# Patient Record
Sex: FEMALE | Race: WHITE | NOT HISPANIC OR LATINO | Employment: UNEMPLOYED | URBAN - METROPOLITAN AREA
[De-identification: names, ages, dates, MRNs, and addresses within clinical notes are randomized per-mention and may not be internally consistent; named-entity substitution may affect disease eponyms.]

---

## 2019-05-07 ENCOUNTER — TELEPHONE (OUTPATIENT)
Dept: ORTHOPEDIC SURGERY | Facility: CLINIC | Age: 29
End: 2019-05-07

## 2019-05-07 NOTE — TELEPHONE ENCOUNTER
Spoke to patient and got her rescheduled. She did not have our number to reschedule. Letter in chart for no show, not sent.

## 2019-05-14 ENCOUNTER — TELEPHONE (OUTPATIENT)
Dept: ORTHOPEDIC SURGERY | Facility: CLINIC | Age: 29
End: 2019-05-14

## 2020-04-21 PROCEDURE — 87491 CHLMYD TRACH DNA AMP PROBE: CPT | Performed by: PHYSICIAN ASSISTANT

## 2020-04-21 PROCEDURE — 87591 N.GONORRHOEAE DNA AMP PROB: CPT | Performed by: PHYSICIAN ASSISTANT

## 2020-04-24 ENCOUNTER — TELEPHONE (OUTPATIENT)
Dept: URGENT CARE | Facility: CLINIC | Age: 30
End: 2020-04-24

## 2022-05-16 ENCOUNTER — HOSPITAL ENCOUNTER (INPATIENT)
Facility: HOSPITAL | Age: 32
LOS: 2 days | Discharge: HOME | End: 2022-05-18
Attending: OBSTETRICS & GYNECOLOGY | Admitting: OBSTETRICS & GYNECOLOGY

## 2022-05-16 PROBLEM — Z37.9 NORMAL LABOR: Status: ACTIVE | Noted: 2022-05-16

## 2022-05-16 PROCEDURE — 99221 1ST HOSP IP/OBS SF/LOW 40: CPT | Performed by: OBSTETRICS & GYNECOLOGY

## 2022-05-16 PROCEDURE — G0480 DRUG TEST DEF 1-7 CLASSES: HCPCS | Performed by: OBSTETRICS & GYNECOLOGY

## 2022-05-16 PROCEDURE — 59025 FETAL NON-STRESS TEST: CPT | Performed by: OBSTETRICS & GYNECOLOGY

## 2022-05-16 PROCEDURE — 80306 DRUG TEST PRSMV INSTRMNT: CPT | Performed by: OBSTETRICS & GYNECOLOGY

## 2022-05-16 RX ORDER — SODIUM CHLORIDE 0.9 % (FLUSH) 0.9 %
3 SYRINGE (ML) INJECTION EVERY 12 HOURS SCHEDULED
Status: DISCONTINUED | OUTPATIENT
Start: 2022-05-16 | End: 2022-05-18 | Stop reason: HOSPADM

## 2022-05-16 RX ORDER — BUTORPHANOL TARTRATE 1 MG/ML
1 INJECTION, SOLUTION INTRAMUSCULAR; INTRAVENOUS
Status: DISCONTINUED | OUTPATIENT
Start: 2022-05-16 | End: 2022-05-17

## 2022-05-16 RX ORDER — LIDOCAINE HYDROCHLORIDE 10 MG/ML
5 INJECTION, SOLUTION EPIDURAL; INFILTRATION; INTRACAUDAL; PERINEURAL AS NEEDED
Status: DISCONTINUED | OUTPATIENT
Start: 2022-05-16 | End: 2022-05-18 | Stop reason: HOSPADM

## 2022-05-16 RX ORDER — ONDANSETRON 2 MG/ML
4 INJECTION INTRAMUSCULAR; INTRAVENOUS EVERY 6 HOURS PRN
Status: DISCONTINUED | OUTPATIENT
Start: 2022-05-16 | End: 2022-05-18 | Stop reason: HOSPADM

## 2022-05-16 RX ORDER — MAGNESIUM CARB/ALUMINUM HYDROX 105-160MG
30 TABLET,CHEWABLE ORAL ONCE
Status: DISCONTINUED | OUTPATIENT
Start: 2022-05-16 | End: 2022-05-18 | Stop reason: HOSPADM

## 2022-05-16 RX ORDER — PRENATAL WITH FERROUS FUM AND FOLIC ACID 3080; 920; 120; 400; 22; 1.84; 3; 20; 10; 1; 12; 200; 27; 25; 2 [IU]/1; [IU]/1; MG/1; [IU]/1; MG/1; MG/1; MG/1; MG/1; MG/1; MG/1; UG/1; MG/1; MG/1; MG/1; MG/1
TABLET ORAL DAILY
COMMUNITY

## 2022-05-16 RX ORDER — ONDANSETRON 4 MG/1
4 TABLET, FILM COATED ORAL EVERY 6 HOURS PRN
Status: DISCONTINUED | OUTPATIENT
Start: 2022-05-16 | End: 2022-05-18 | Stop reason: HOSPADM

## 2022-05-16 RX ORDER — SODIUM CHLORIDE, SODIUM LACTATE, POTASSIUM CHLORIDE, CALCIUM CHLORIDE 600; 310; 30; 20 MG/100ML; MG/100ML; MG/100ML; MG/100ML
125 INJECTION, SOLUTION INTRAVENOUS CONTINUOUS
Status: DISCONTINUED | OUTPATIENT
Start: 2022-05-16 | End: 2022-05-18 | Stop reason: HOSPADM

## 2022-05-16 RX ORDER — SODIUM CHLORIDE 0.9 % (FLUSH) 0.9 %
10 SYRINGE (ML) INJECTION AS NEEDED
Status: DISCONTINUED | OUTPATIENT
Start: 2022-05-16 | End: 2022-05-18 | Stop reason: HOSPADM

## 2022-05-17 ENCOUNTER — ANESTHESIA EVENT (OUTPATIENT)
Dept: LABOR AND DELIVERY | Facility: HOSPITAL | Age: 32
End: 2022-05-17

## 2022-05-17 ENCOUNTER — APPOINTMENT (OUTPATIENT)
Dept: WOMENS IMAGING | Facility: HOSPITAL | Age: 32
End: 2022-05-17

## 2022-05-17 ENCOUNTER — ANESTHESIA (OUTPATIENT)
Dept: LABOR AND DELIVERY | Facility: HOSPITAL | Age: 32
End: 2022-05-17

## 2022-05-17 LAB
ABO GROUP BLD: NORMAL
ALP SERPL-CCNC: 338 U/L (ref 39–117)
ALT SERPL W P-5'-P-CCNC: 8 U/L (ref 1–33)
AMPHET+METHAMPHET UR QL: NEGATIVE
AMPHETAMINES UR QL: NEGATIVE
AST SERPL-CCNC: 16 U/L (ref 1–32)
ATMOSPHERIC PRESS: ABNORMAL MM[HG]
ATMOSPHERIC PRESS: ABNORMAL MM[HG]
BARBITURATES UR QL SCN: NEGATIVE
BASE EXCESS BLDCOA CALC-SCNC: -6.3 MMOL/L (ref 0–2)
BASE EXCESS BLDCOV CALC-SCNC: -5.5 MMOL/L (ref 0–2)
BASOPHILS # BLD AUTO: 0.02 10*3/MM3 (ref 0–0.2)
BASOPHILS NFR BLD AUTO: 0.1 % (ref 0–1.5)
BDY SITE: ABNORMAL
BDY SITE: ABNORMAL
BENZODIAZ UR QL SCN: NEGATIVE
BILIRUB SERPL-MCNC: 0.3 MG/DL (ref 0–1.2)
BLD GP AB SCN SERPL QL: NEGATIVE
BODY TEMPERATURE: 37 C
BODY TEMPERATURE: 37 C
BUPRENORPHINE SERPL-MCNC: NEGATIVE NG/ML
CANNABINOIDS SERPL QL: NEGATIVE
CO2 BLDA-SCNC: 23.5 MMOL/L (ref 22–33)
CO2 BLDA-SCNC: 25.9 MMOL/L (ref 22–33)
COCAINE UR QL: POSITIVE
COLLECT TME SMN: ABNORMAL
CREAT SERPL-MCNC: 0.64 MG/DL (ref 0.57–1)
DEPRECATED RDW RBC AUTO: 46.1 FL (ref 37–54)
EOSINOPHIL # BLD AUTO: 0.06 10*3/MM3 (ref 0–0.4)
EOSINOPHIL NFR BLD AUTO: 0.4 % (ref 0.3–6.2)
EPAP: 0
EPAP: 0
ERYTHROCYTE [DISTWIDTH] IN BLOOD BY AUTOMATED COUNT: 14.4 % (ref 12.3–15.4)
HBV SURFACE AG SERPL QL IA: NORMAL
HCO3 BLDCOA-SCNC: 23.9 MMOL/L (ref 16.9–20.5)
HCO3 BLDCOV-SCNC: 22 MMOL/L (ref 18.6–21.4)
HCT VFR BLD AUTO: 34.4 % (ref 34–46.6)
HCV AB SER DONR QL: REACTIVE
HGB BLD-MCNC: 11.5 G/DL (ref 12–15.9)
HGB BLDA-MCNC: 16.6 G/DL (ref 14–18)
HGB BLDA-MCNC: 16.7 G/DL (ref 14–18)
HIV1+2 AB SER QL: NORMAL
IMM GRANULOCYTES # BLD AUTO: 0.07 10*3/MM3 (ref 0–0.05)
IMM GRANULOCYTES NFR BLD AUTO: 0.4 % (ref 0–0.5)
INHALED O2 CONCENTRATION: 21 %
INHALED O2 CONCENTRATION: 21 %
IPAP: 0
IPAP: 0
LDH SERPL-CCNC: 167 U/L (ref 135–214)
LYMPHOCYTES # BLD AUTO: 1.88 10*3/MM3 (ref 0.7–3.1)
LYMPHOCYTES NFR BLD AUTO: 11.1 % (ref 19.6–45.3)
Lab: ABNORMAL
MCH RBC QN AUTO: 29.3 PG (ref 26.6–33)
MCHC RBC AUTO-ENTMCNC: 33.4 G/DL (ref 31.5–35.7)
MCV RBC AUTO: 87.5 FL (ref 79–97)
METHADONE UR QL SCN: NEGATIVE
MODALITY: ABNORMAL
MODALITY: ABNORMAL
MONOCYTES # BLD AUTO: 1.29 10*3/MM3 (ref 0.1–0.9)
MONOCYTES NFR BLD AUTO: 7.6 % (ref 5–12)
NEUTROPHILS NFR BLD AUTO: 13.59 10*3/MM3 (ref 1.7–7)
NEUTROPHILS NFR BLD AUTO: 80.4 % (ref 42.7–76)
NOTE: ABNORMAL
NOTE: ABNORMAL
NOTIFIED BY: ABNORMAL
NOTIFIED WHO: ABNORMAL
NRBC BLD AUTO-RTO: 0 /100 WBC (ref 0–0.2)
OPIATES UR QL: NEGATIVE
OXYCODONE UR QL SCN: NEGATIVE
PAW @ PEAK INSP FLOW SETTING VENT: 0 CMH2O
PAW @ PEAK INSP FLOW SETTING VENT: 0 CMH2O
PCO2 BLDCOA: 65.8 MMHG (ref 43.3–54.9)
PCO2 BLDCOV: 49.2 MM HG (ref 28–40)
PCP UR QL SCN: NEGATIVE
PH BLDCOA: 7.17 PH UNITS (ref 7.22–7.3)
PH BLDCOV: 7.26 PH UNITS (ref 7.31–7.37)
PLATELET # BLD AUTO: 360 10*3/MM3 (ref 140–450)
PMV BLD AUTO: 10.3 FL (ref 6–12)
PO2 BLDCOA: 8.7 MMHG (ref 11.5–43.3)
PO2 BLDCOV: 22.4 MM HG (ref 21–31)
PROPOXYPH UR QL: NEGATIVE
RBC # BLD AUTO: 3.93 10*6/MM3 (ref 3.77–5.28)
RH BLD: POSITIVE
RPR SER QL: NORMAL
SAO2 % BLDCOA: 7.7 %
SAO2 % BLDCOA: ABNORMAL %
SAO2 % BLDCOV: 45.4 %
SARS-COV-2 RDRP RESP QL NAA+PROBE: NORMAL
T&S EXPIRATION DATE: NORMAL
TOTAL RATE: 0 BREATHS/MINUTE
TOTAL RATE: 0 BREATHS/MINUTE
TRICYCLICS UR QL SCN: NEGATIVE
URATE SERPL-MCNC: 4.5 MG/DL (ref 2.4–5.7)
VENTILATOR MODE: ABNORMAL
WBC NRBC COR # BLD: 16.91 10*3/MM3 (ref 3.4–10.8)

## 2022-05-17 PROCEDURE — 80081 OBSTETRIC PANEL INC HIV TSTG: CPT | Performed by: OBSTETRICS & GYNECOLOGY

## 2022-05-17 PROCEDURE — 76815 OB US LIMITED FETUS(S): CPT

## 2022-05-17 PROCEDURE — 84075 ASSAY ALKALINE PHOSPHATASE: CPT | Performed by: OBSTETRICS & GYNECOLOGY

## 2022-05-17 PROCEDURE — C1755 CATHETER, INTRASPINAL: HCPCS | Performed by: ANESTHESIOLOGY

## 2022-05-17 PROCEDURE — 84460 ALANINE AMINO (ALT) (SGPT): CPT | Performed by: OBSTETRICS & GYNECOLOGY

## 2022-05-17 PROCEDURE — 76815 OB US LIMITED FETUS(S): CPT | Performed by: OBSTETRICS & GYNECOLOGY

## 2022-05-17 PROCEDURE — 87635 SARS-COV-2 COVID-19 AMP PRB: CPT | Performed by: OBSTETRICS & GYNECOLOGY

## 2022-05-17 PROCEDURE — 82565 ASSAY OF CREATININE: CPT | Performed by: OBSTETRICS & GYNECOLOGY

## 2022-05-17 PROCEDURE — 25010000002 ONDANSETRON PER 1 MG: Performed by: OBSTETRICS & GYNECOLOGY

## 2022-05-17 PROCEDURE — 51702 INSERT TEMP BLADDER CATH: CPT

## 2022-05-17 PROCEDURE — 84450 TRANSFERASE (AST) (SGOT): CPT | Performed by: OBSTETRICS & GYNECOLOGY

## 2022-05-17 PROCEDURE — 25010000002 PENICILLIN G POTASSIUM PER 600000 UNITS: Performed by: OBSTETRICS & GYNECOLOGY

## 2022-05-17 PROCEDURE — 83615 LACTATE (LD) (LDH) ENZYME: CPT | Performed by: OBSTETRICS & GYNECOLOGY

## 2022-05-17 PROCEDURE — 82247 BILIRUBIN TOTAL: CPT | Performed by: OBSTETRICS & GYNECOLOGY

## 2022-05-17 PROCEDURE — 25010000002 ROPIVACAINE PER 1 MG: Performed by: ANESTHESIOLOGY

## 2022-05-17 PROCEDURE — 25010000002 METOCLOPRAMIDE PER 10 MG: Performed by: ANESTHESIOLOGY

## 2022-05-17 PROCEDURE — 59409 OBSTETRICAL CARE: CPT | Performed by: OBSTETRICS & GYNECOLOGY

## 2022-05-17 PROCEDURE — 3E0E7GC INTRODUCTION OF OTHER THERAPEUTIC SUBSTANCE INTO PRODUCTS OF CONCEPTION, VIA NATURAL OR ARTIFICIAL OPENING: ICD-10-PCS | Performed by: OBSTETRICS & GYNECOLOGY

## 2022-05-17 PROCEDURE — 25010000002 MORPHINE PER 10 MG: Performed by: OBSTETRICS & GYNECOLOGY

## 2022-05-17 PROCEDURE — 82805 BLOOD GASES W/O2 SATURATION: CPT

## 2022-05-17 PROCEDURE — 86803 HEPATITIS C AB TEST: CPT | Performed by: OBSTETRICS & GYNECOLOGY

## 2022-05-17 PROCEDURE — 59025 FETAL NON-STRESS TEST: CPT

## 2022-05-17 PROCEDURE — 88307 TISSUE EXAM BY PATHOLOGIST: CPT | Performed by: OBSTETRICS & GYNECOLOGY

## 2022-05-17 PROCEDURE — 84550 ASSAY OF BLOOD/URIC ACID: CPT | Performed by: OBSTETRICS & GYNECOLOGY

## 2022-05-17 PROCEDURE — 25010000002 FENTANYL CITRATE (PF) 50 MCG/ML SOLUTION: Performed by: ANESTHESIOLOGY

## 2022-05-17 RX ORDER — OXYTOCIN/0.9 % SODIUM CHLORIDE 30/500 ML
2-24 PLASTIC BAG, INJECTION (ML) INTRAVENOUS
Status: DISCONTINUED | OUTPATIENT
Start: 2022-05-17 | End: 2022-05-18 | Stop reason: HOSPADM

## 2022-05-17 RX ORDER — CARBOPROST TROMETHAMINE 250 UG/ML
250 INJECTION, SOLUTION INTRAMUSCULAR AS NEEDED
Status: DISCONTINUED | OUTPATIENT
Start: 2022-05-17 | End: 2022-05-18 | Stop reason: HOSPADM

## 2022-05-17 RX ORDER — OXYTOCIN/0.9 % SODIUM CHLORIDE 30/500 ML
85 PLASTIC BAG, INJECTION (ML) INTRAVENOUS ONCE
Status: COMPLETED | OUTPATIENT
Start: 2022-05-18 | End: 2022-05-17

## 2022-05-17 RX ORDER — ERYTHROMYCIN 5 MG/G
OINTMENT OPHTHALMIC
Status: DISCONTINUED
Start: 2022-05-17 | End: 2022-05-18 | Stop reason: HOSPADM

## 2022-05-17 RX ORDER — MISOPROSTOL 200 UG/1
800 TABLET ORAL AS NEEDED
Status: DISCONTINUED | OUTPATIENT
Start: 2022-05-17 | End: 2022-05-18 | Stop reason: HOSPADM

## 2022-05-17 RX ORDER — METHYLERGONOVINE MALEATE 0.2 MG/ML
200 INJECTION INTRAVENOUS ONCE AS NEEDED
Status: DISCONTINUED | OUTPATIENT
Start: 2022-05-17 | End: 2022-05-18 | Stop reason: HOSPADM

## 2022-05-17 RX ORDER — METOCLOPRAMIDE HYDROCHLORIDE 5 MG/ML
10 INJECTION INTRAMUSCULAR; INTRAVENOUS ONCE AS NEEDED
Status: COMPLETED | OUTPATIENT
Start: 2022-05-17 | End: 2022-05-17

## 2022-05-17 RX ORDER — EPHEDRINE SULFATE 5 MG/ML
INJECTION INTRAVENOUS
Status: DISCONTINUED
Start: 2022-05-17 | End: 2022-05-18 | Stop reason: HOSPADM

## 2022-05-17 RX ORDER — FAMOTIDINE 10 MG/ML
20 INJECTION, SOLUTION INTRAVENOUS ONCE AS NEEDED
Status: DISCONTINUED | OUTPATIENT
Start: 2022-05-17 | End: 2022-05-18 | Stop reason: HOSPADM

## 2022-05-17 RX ORDER — ACETAMINOPHEN 500 MG
1000 TABLET ORAL ONCE
Status: COMPLETED | OUTPATIENT
Start: 2022-05-17 | End: 2022-05-17

## 2022-05-17 RX ORDER — ONDANSETRON 2 MG/ML
4 INJECTION INTRAMUSCULAR; INTRAVENOUS ONCE AS NEEDED
Status: DISCONTINUED | OUTPATIENT
Start: 2022-05-17 | End: 2022-05-18 | Stop reason: HOSPADM

## 2022-05-17 RX ORDER — TRISODIUM CITRATE DIHYDRATE AND CITRIC ACID MONOHYDRATE 500; 334 MG/5ML; MG/5ML
30 SOLUTION ORAL ONCE
Status: DISCONTINUED | OUTPATIENT
Start: 2022-05-17 | End: 2022-05-18 | Stop reason: HOSPADM

## 2022-05-17 RX ORDER — FENTANYL CITRATE 50 UG/ML
INJECTION, SOLUTION INTRAMUSCULAR; INTRAVENOUS AS NEEDED
Status: DISCONTINUED | OUTPATIENT
Start: 2022-05-17 | End: 2022-05-18 | Stop reason: SURG

## 2022-05-17 RX ORDER — OXYTOCIN/0.9 % SODIUM CHLORIDE 30/500 ML
650 PLASTIC BAG, INJECTION (ML) INTRAVENOUS ONCE
Status: COMPLETED | OUTPATIENT
Start: 2022-05-18 | End: 2022-05-17

## 2022-05-17 RX ORDER — LIDOCAINE HYDROCHLORIDE AND EPINEPHRINE 15; 5 MG/ML; UG/ML
INJECTION, SOLUTION EPIDURAL AS NEEDED
Status: DISCONTINUED | OUTPATIENT
Start: 2022-05-17 | End: 2022-05-18 | Stop reason: SURG

## 2022-05-17 RX ORDER — DIPHENHYDRAMINE HYDROCHLORIDE 50 MG/ML
12.5 INJECTION INTRAMUSCULAR; INTRAVENOUS EVERY 8 HOURS PRN
Status: DISCONTINUED | OUTPATIENT
Start: 2022-05-17 | End: 2022-05-18 | Stop reason: HOSPADM

## 2022-05-17 RX ORDER — NICOTINE 21 MG/24HR
1 PATCH, TRANSDERMAL 24 HOURS TRANSDERMAL
Status: DISCONTINUED | OUTPATIENT
Start: 2022-05-17 | End: 2022-05-18 | Stop reason: HOSPADM

## 2022-05-17 RX ORDER — ROPIVACAINE HYDROCHLORIDE 2 MG/ML
15 INJECTION, SOLUTION EPIDURAL; INFILTRATION; PERINEURAL CONTINUOUS
Status: DISCONTINUED | OUTPATIENT
Start: 2022-05-17 | End: 2022-05-18 | Stop reason: HOSPADM

## 2022-05-17 RX ORDER — MORPHINE SULFATE 4 MG/ML
4 INJECTION, SOLUTION INTRAMUSCULAR; INTRAVENOUS
Status: DISCONTINUED | OUTPATIENT
Start: 2022-05-17 | End: 2022-05-18 | Stop reason: HOSPADM

## 2022-05-17 RX ORDER — EPHEDRINE SULFATE 5 MG/ML
10 INJECTION INTRAVENOUS
Status: DISCONTINUED | OUTPATIENT
Start: 2022-05-17 | End: 2022-05-18 | Stop reason: HOSPADM

## 2022-05-17 RX ADMIN — Medication 1 MILLI-UNITS/MIN: at 08:07

## 2022-05-17 RX ADMIN — MORPHINE SULFATE 4 MG: 4 INJECTION, SOLUTION INTRAMUSCULAR; INTRAVENOUS at 04:24

## 2022-05-17 RX ADMIN — METOCLOPRAMIDE 10 MG: 5 INJECTION, SOLUTION INTRAMUSCULAR; INTRAVENOUS at 22:39

## 2022-05-17 RX ADMIN — LIDOCAINE HYDROCHLORIDE AND EPINEPHRINE 2 ML: 15; 5 INJECTION, SOLUTION EPIDURAL at 05:15

## 2022-05-17 RX ADMIN — SODIUM CHLORIDE, POTASSIUM CHLORIDE, SODIUM LACTATE AND CALCIUM CHLORIDE 125 ML/HR: 600; 310; 30; 20 INJECTION, SOLUTION INTRAVENOUS at 05:41

## 2022-05-17 RX ADMIN — SODIUM CHLORIDE 3 MILLION UNITS: 9 INJECTION, SOLUTION INTRAVENOUS at 14:37

## 2022-05-17 RX ADMIN — SODIUM CHLORIDE 3 MILLION UNITS: 9 INJECTION, SOLUTION INTRAVENOUS at 22:39

## 2022-05-17 RX ADMIN — Medication 650 ML/HR: at 23:17

## 2022-05-17 RX ADMIN — SODIUM CHLORIDE, POTASSIUM CHLORIDE, SODIUM LACTATE AND CALCIUM CHLORIDE 1000 ML: 600; 310; 30; 20 INJECTION, SOLUTION INTRAVENOUS at 04:57

## 2022-05-17 RX ADMIN — ROPIVACAINE HYDROCHLORIDE 14 ML/HR: 2 INJECTION, SOLUTION EPIDURAL; INFILTRATION at 05:21

## 2022-05-17 RX ADMIN — FENTANYL CITRATE 100 MCG: 50 INJECTION, SOLUTION INTRAMUSCULAR; INTRAVENOUS at 05:16

## 2022-05-17 RX ADMIN — Medication 1 PATCH: at 02:08

## 2022-05-17 RX ADMIN — SODIUM CHLORIDE, POTASSIUM CHLORIDE, SODIUM LACTATE AND CALCIUM CHLORIDE 125 ML/HR: 600; 310; 30; 20 INJECTION, SOLUTION INTRAVENOUS at 01:41

## 2022-05-17 RX ADMIN — ROPIVACAINE HYDROCHLORIDE 14 ML/HR: 2 INJECTION, SOLUTION EPIDURAL; INFILTRATION at 12:42

## 2022-05-17 RX ADMIN — SODIUM CHLORIDE 5 MILLION UNITS: 900 INJECTION INTRAVENOUS at 02:08

## 2022-05-17 RX ADMIN — SODIUM CHLORIDE, POTASSIUM CHLORIDE, SODIUM LACTATE AND CALCIUM CHLORIDE 300 ML: 600; 310; 30; 20 INJECTION, SOLUTION INTRAVENOUS at 09:40

## 2022-05-17 RX ADMIN — ACETAMINOPHEN 1000 MG: 500 TABLET ORAL at 19:21

## 2022-05-17 RX ADMIN — SODIUM CHLORIDE 3 MILLION UNITS: 9 INJECTION, SOLUTION INTRAVENOUS at 18:44

## 2022-05-17 RX ADMIN — SODIUM CHLORIDE 3 MILLION UNITS: 9 INJECTION, SOLUTION INTRAVENOUS at 09:49

## 2022-05-17 RX ADMIN — ONDANSETRON 4 MG: 2 INJECTION INTRAMUSCULAR; INTRAVENOUS at 13:12

## 2022-05-17 RX ADMIN — Medication 1 MILLI-UNITS/MIN: at 03:50

## 2022-05-17 RX ADMIN — MORPHINE SULFATE 4 MG: 4 INJECTION, SOLUTION INTRAMUSCULAR; INTRAVENOUS at 02:14

## 2022-05-17 RX ADMIN — LIDOCAINE HYDROCHLORIDE AND EPINEPHRINE 3 ML: 15; 5 INJECTION, SOLUTION EPIDURAL at 05:14

## 2022-05-17 RX ADMIN — ROPIVACAINE HYDROCHLORIDE 15 ML/HR: 2 INJECTION, SOLUTION EPIDURAL; INFILTRATION at 19:45

## 2022-05-17 RX ADMIN — Medication 85 ML/HR: at 23:55

## 2022-05-17 RX ADMIN — ROPIVACAINE HYDROCHLORIDE 10 ML: 5 INJECTION, SOLUTION EPIDURAL; INFILTRATION; PERINEURAL at 05:16

## 2022-05-17 RX ADMIN — SODIUM CHLORIDE, POTASSIUM CHLORIDE, SODIUM LACTATE AND CALCIUM CHLORIDE 125 ML/HR: 600; 310; 30; 20 INJECTION, SOLUTION INTRAVENOUS at 14:05

## 2022-05-17 NOTE — PROGRESS NOTES
Breana Coleman  [id number]  1990      VE 4-5/80%/-1  IUPC placed  Labs Hep C+ (pt did know this)  UDS + for cocaine, pt denies ever using  P/1)hold pitocin until PDC scan (to r/o any reason for vaginal delivery)    Isaias Barclay MD  5/17/2022  06:58 EDT

## 2022-05-17 NOTE — PROGRESS NOTES
LAborist    FHR cat 1  SVE  5/80/-1  ROP  CTX 3-6 min and inadequate contractions.  PLAN  1.  Continue Pitocin augmentation

## 2022-05-17 NOTE — PROGRESS NOTES
Breana Coleman  [id number]  1990      VE 4/80%/-1  FHT's reassuring.  IV now in.  P/1)oxytocin per protocol    Isaias Barclay MD  5/17/2022  01:45 EDT

## 2022-05-17 NOTE — H&P
"Breana Coleman  1990  [id number]  [id number]    CC: contractions  HPI:  Patient is 31 y.o. female   currently at Unknown presents with c/o uterine contractions, onset this am, rates 8/10, denies assoc bleeding or leaking.  Good FM.  PNC comp by tob use and no PNC     PMH:  Current meds: PNV  Illnesses: MRSA  Surgeries: Mult I and D's (~18) for MRSA  Allergies: NKDA    Past OB History:       OB History    Para Term  AB Living   2 1 1 0 0 1   SAB IAB Ectopic Molar Multiple Live Births   0 0 0 0 0 1      # Outcome Date GA Lbr Aaron/2nd Weight Sex Delivery Anes PTL Lv   2 Current            1 Term 12 39w0d  2807 g (6 lb 3 oz) F Vag-Spont EPI N NORMA      Complications: Preeclampsia            SH: tob 1PPD , EtOH neg, drugs neg  FH: heart dz neg , diabetes neg , cancer neg    General ROS: contractions.   All other systems reviewed and are negative.      Physical Examination: General appearance - alert, well appearing, and in no distress  Vital signs - /96   Pulse 107   Temp 98.2 °F (36.8 °C) (Oral)   Resp 18   Ht 149.9 cm (59\")   LMP 2021 (Approximate)   SpO2 96%   Breastfeeding No   BMI 22.22 kg/m²   HEENT: normocephalic, atraumatic,oropharynx clear, appearance of ears and nose normal  Neck - supple, no significant adenopathy, no thyromegaly  Lymphatics - no palpable lymphadenopathy in the neck or groin, no hepatosplenomegaly  Chest - clear to auscultation, no wheezes, rales or rhonchi, respiratory effort non-labored  Heart - normal rate, regular rhythm, no murmurs, rubs, clicks or gallops, no JVD, no lower extremity edema  Abdomen - soft, nontender, nondistended, no masses, no hepatosplenomegaly  no rebound tenderness noted, bowel sounds normal  Vaginal Exam: 380%/-1 (per RN) ,external genitalia normal  Extremities - no pedal edema noted, no calf tend, mult healed skin lesions  Skin -warm and dry, normal coloration and turgor, no rashes, no suspicious skin lesions " noted    Fetal monitoring: indication contractions , onset 2110 , offset 2217 , baseline 120-125 , mod BTB variability , multiple accels (15 X 15), one variable decel, irreg contractions, interpretation reactive NST with single variable    Radiology US: AC ~35 weeks, FL ~38 weeks, ant placenta (mature appearing), oligo, anomaly scan not performed due to oligo and ant placenta    Assessment 1)IUP ~37 weeks   2)labor   3)no PNC   4)hx MRSA   5)tob use     Plan 1)admit   2)GBS prophylaxis (since unsure dates)    Isaias Barclay MD  5/16/2022  22:13 EDT

## 2022-05-17 NOTE — CASE MANAGEMENT/SOCIAL WORK
Continued Stay Note  [facility]     Patient Name: Breana Coleman  MRN: [id number]  Today's Date: 2022    Admit Date: 2022     Discharge Plan     Row Name 22 1542       Plan    Plan MSW available    Plan Comments Visited pt and FOB. Mother reports MGF became very ill prior to scheduled c/s and she had to make decision to take him off life support on . She reports she was with him when he . Mother is very tearful and is actively expressing her emotions and grief. Pt reports she would like to discuss meds with her provider and may be interested in therapy. MSW offered support. Notified MD and provided info on [place] for therapy.    Final Discharge Disposition Code 01 - home or self-care               Discharge Codes    No documentation.                     FREDDIE Frausto

## 2022-05-17 NOTE — ANESTHESIA PROCEDURE NOTES
Labor Epidural      Patient reassessed immediately prior to procedure    Patient location during procedure: OB  Performed By  Anesthesiologist: Chris Carbajal DO  Preanesthetic Checklist  Completed: patient identified, IV checked, site marked, risks and benefits discussed, surgical consent, monitors and equipment checked, pre-op evaluation and timeout performed  Additional Notes  Dural puncture performed with 5 inch 25 g Silvio needle, clear CSF.  Prep:  Pt Position:sitting  Sterile Tech:gloves, mask, sterile barrier and cap  Prep:chlorhexidine gluconate and isopropyl alcohol  Monitoring:blood pressure monitoring and continuous pulse oximetry  Epidural Block Procedure:  Approach:midline  Guidance:landmark technique and palpation technique  Location:L3-L4  Needle Type:Tuohy  Needle Gauge:17 G  Loss of Resistance Medium: air  Loss of Resistance: 3cm  Cath Depth at skin:8 cm  Paresthesia: none  Aspiration:negative  Test Dose:negative  Number of Attempts: 1  Post Assessment:  Dressing:secured with tape and occlusive dressing applied (Tegaderm Placed)  Pt Tolerance:patient tolerated the procedure well with no apparent complications  Complications:no

## 2022-05-17 NOTE — PLAN OF CARE
Problem: Adult Inpatient Plan of Care  Goal: Plan of Care Review  Outcome: Ongoing, Progressing  Goal: Patient-Specific Goal (Individualized)  Outcome: Ongoing, Progressing  Goal: Absence of Hospital-Acquired Illness or Injury  Outcome: Ongoing, Progressing  Intervention: Prevent Skin Injury  Description: Perform a screening for skin injury risk, such as pressure or moisture associated skin damage on admission and at regular intervals throughout hospital stay.  Keep all areas of skin (especially folds) clean and dry.  Maintain adequate skin hydration.  Relieve and redistribute pressure and protect bony prominences; implement measures based on patient-specific risk factors.  Match turning and repositioning schedule to clinical condition.  Encourage weight shift frequently; assist with reposition if unable to complete independently.  Float heels off bed; avoid pressure on the Achilles tendon.  Keep skin free from extended contact with medical devices.  Encourage functional activity and mobility, as early as tolerated.  Use aids (e.g., slide boards, mechanical lift) during transfer.  Recent Flowsheet Documentation  Taken 5/17/2022 1921 by Maria Antonia Nice RN  Body Position:   left   30 degrees  Intervention: Prevent Infection  Description: Maintain skin and mucous membrane integrity; promote hand, oral and pulmonary hygiene.  Optimize fluid balance, nutrition, sleep and glycemic control to maximize infection resistance.  Identify potential sources of infection early to prevent or mitigate progression of infection (e.g., wound, lines, devices).  Evaluate ongoing need for invasive devices; remove promptly when no longer indicated.  Recent Flowsheet Documentation  Taken 5/17/2022 1921 by Maria Antonia Nice, RN  Infection Prevention:   visitors restricted/screened   single patient room provided   rest/sleep promoted   personal protective equipment utilized   hand hygiene promoted   equipment surfaces disinfected    environmental surveillance performed  Goal: Optimal Comfort and Wellbeing  Outcome: Ongoing, Progressing  Intervention: Provide Person-Centered Care  Description: Use a family-focused approach to care.  Develop trust and rapport by proactively providing information, encouraging questions, addressing concerns and offering reassurance.  Acknowledge emotional response to hospitalization.  Recognize and utilize personal coping strategies.  Honor spiritual and cultural preferences.  Recent Flowsheet Documentation  Taken 2022 1921 by Maria Antonia Nice RN  Trust Relationship/Rapport:   care explained   choices provided   emotional support provided   empathic listening provided   questions answered   questions encouraged   reassurance provided   thoughts/feelings acknowledged  Goal: Readiness for Transition of Care  Outcome: Ongoing, Progressing     Problem: Bleeding (Labor)  Goal: Hemostasis  Outcome: Ongoing, Progressing     Problem: Change in Fetal Wellbeing (Labor)  Goal: Stable Fetal Wellbeing  Outcome: Ongoing, Progressing  Intervention: Promote and Monitor Fetal Wellbeing  Description: Evaluate fetal heart rate tracing.  Initiate continuous electronic fetal monitoring if patient is experiencing a TOLAC (trial of labor after ).  Facilitate maternal lateral position change to improve uteroplacental blood flow and maternal blood pressure; utilize hip wedge as needed.  Monitor uterine contraction pattern; assess for presence of tachysystole.  Prepare to discontinue oxytocin or labor induction agent in the presence of tachysystole, as applicable.  Anticipate need for tocolytic administration if tachysystole persists.  Prepare for intravenous fluid bolus administration to improve maternal fluid status and treat hypotension.  In the presence of maternal hypoxia, prepare to administer oxygen therapy.  Review maternal medication history for possible impact on fetal heart rate tracing (e.g.,  corticosteroid).  Prepare for additional procedure, as indicated, to improve fetal wellbeing (e.g., amnioinfusion, fetal stimulation).  Modify pushing effort, if in second stage of labor.  Prepare for expedited delivery if fetal status does not improve.  Recent Flowsheet Documentation  Taken 2022 by Maria Antonia Nice RN  Body Position:   left   30 degrees     Problem: Delayed Labor Progression (Labor)  Goal: Effective Progression to Delivery  Outcome: Ongoing, Progressing     Problem: Infection (Labor)  Goal: Absence of Infection Signs and Symptoms  Outcome: Ongoing, Progressing  Intervention: Prevent or Manage Infection  Description: Verify Group B streptococcus status and presence of known infection.  Limit digital vaginal exams or invasive vaginal procedures, especially after membranes are ruptured.  Promote perineal hygiene.  Prepare to administer antimicrobial therapy prophylaxis within 60 minutes prior to  delivery, unless there is current coverage provided by existing antimicrobial therapy regimen. Note: If  delivery is urgently needed, prophylaxis shou  Obtain cultures prior to initiating antimicrobial therapy when possible, if suspected or known infection. Do not delay treatment for laboratory results in the presence of high suspicion or clinical indicators.  Administer ordered antimicrobial therapy promptly; reassess need regularly.  Provide fever-reduction and comfort measures.  Monitor laboratory value, diagnostic test and clinical status trends for signs of infection progression.  Identify early signs of sepsis, such as increased heart rate and decreased blood pressure, as well as changes in mental state, respiratory pattern or peripheral perfusion.  Prepare for rapid sepsis management, including lactate level, intravenous access, fluid administration and oxygen therapy.  Notify  team for delivery.  Anticipate need for single-dose antimicrobial therapy prophylaxis  following anal sphincter injury.  Prepare to send placenta for histology following delivery, as indicated.  Recent Flowsheet Documentation  Taken 5/17/2022 1921 by Maria Antonia Nice, RN  Infection Prevention:   visitors restricted/screened   single patient room provided   rest/sleep promoted   personal protective equipment utilized   hand hygiene promoted   equipment surfaces disinfected   environmental surveillance performed     Problem: Labor Pain (Labor)  Goal: Acceptable Pain Control  Outcome: Ongoing, Progressing     Problem: Uterine Tachysystole (Labor)  Goal: Normal Uterine Contraction Pattern  Outcome: Ongoing, Progressing   Goal Outcome Evaluation:

## 2022-05-17 NOTE — ANESTHESIA PREPROCEDURE EVALUATION
Anesthesia Evaluation     Patient summary reviewed and Nursing notes reviewed   NPO Solid Status: > 6 hours  NPO Liquid Status: < 2 hours           Airway   Mallampati: II  TM distance: >3 FB  Neck ROM: full  No difficulty expected  Dental      Pulmonary - negative pulmonary ROS   Cardiovascular - negative cardio ROS        Neuro/Psych- negative ROS  GI/Hepatic/Renal/Endo - negative ROS     Musculoskeletal (-) negative ROS    Abdominal    Substance History - negative use     OB/GYN    (+) Pregnant, pregnancy induced hypertension        Other                        Anesthesia Plan    ASA 2     epidural       Anesthetic plan, all risks, benefits, and alternatives have been provided, discussed and informed consent has been obtained with: patient.  Use of blood products discussed with patient .       CODE STATUS:    Level Of Support Discussed With: Patient  Code Status (Patient has no pulse and is not breathing): CPR (Attempt to Resuscitate)  Medical Interventions (Patient has pulse or is breathing): Full Support

## 2022-05-18 VITALS
RESPIRATION RATE: 18 BRPM | OXYGEN SATURATION: 96 % | DIASTOLIC BLOOD PRESSURE: 85 MMHG | BODY MASS INDEX: 22.22 KG/M2 | SYSTOLIC BLOOD PRESSURE: 133 MMHG | HEIGHT: 59 IN | TEMPERATURE: 98 F | HEART RATE: 68 BPM

## 2022-05-18 LAB — RUBV IGG SERPL IA-ACNC: 4.43 INDEX

## 2022-05-18 PROCEDURE — 59025 FETAL NON-STRESS TEST: CPT

## 2022-05-18 PROCEDURE — 51701 INSERT BLADDER CATHETER: CPT

## 2022-05-18 PROCEDURE — 99238 HOSP IP/OBS DSCHRG MGMT 30/<: CPT | Performed by: OBSTETRICS & GYNECOLOGY

## 2022-05-18 RX ORDER — KETOROLAC TROMETHAMINE 30 MG/ML
30 INJECTION, SOLUTION INTRAMUSCULAR; INTRAVENOUS EVERY 6 HOURS PRN
Status: DISCONTINUED | OUTPATIENT
Start: 2022-05-18 | End: 2022-05-18 | Stop reason: HOSPADM

## 2022-05-18 RX ORDER — IBUPROFEN 600 MG/1
600 TABLET ORAL EVERY 6 HOURS PRN
Status: DISCONTINUED | OUTPATIENT
Start: 2022-05-18 | End: 2022-05-18 | Stop reason: HOSPADM

## 2022-05-18 RX ORDER — BISACODYL 10 MG
10 SUPPOSITORY, RECTAL RECTAL DAILY PRN
Status: DISCONTINUED | OUTPATIENT
Start: 2022-05-18 | End: 2022-05-18 | Stop reason: HOSPADM

## 2022-05-18 RX ORDER — ACETAMINOPHEN 500 MG
1000 TABLET ORAL EVERY 4 HOURS PRN
Status: DISCONTINUED | OUTPATIENT
Start: 2022-05-18 | End: 2022-05-18 | Stop reason: HOSPADM

## 2022-05-18 RX ORDER — DOCUSATE SODIUM 100 MG/1
100 CAPSULE, LIQUID FILLED ORAL 2 TIMES DAILY
Status: DISCONTINUED | OUTPATIENT
Start: 2022-05-18 | End: 2022-05-18 | Stop reason: HOSPADM

## 2022-05-18 RX ORDER — SODIUM CHLORIDE 0.9 % (FLUSH) 0.9 %
1-10 SYRINGE (ML) INJECTION AS NEEDED
Status: DISCONTINUED | OUTPATIENT
Start: 2022-05-18 | End: 2022-05-18 | Stop reason: HOSPADM

## 2022-05-18 RX ORDER — HYDROXYZINE HYDROCHLORIDE 25 MG/1
50 TABLET, FILM COATED ORAL NIGHTLY PRN
Status: DISCONTINUED | OUTPATIENT
Start: 2022-05-18 | End: 2022-05-18 | Stop reason: HOSPADM

## 2022-05-18 RX ORDER — HYDROCORTISONE 25 MG/G
1 CREAM TOPICAL AS NEEDED
Status: DISCONTINUED | OUTPATIENT
Start: 2022-05-18 | End: 2022-05-18 | Stop reason: HOSPADM

## 2022-05-18 RX ADMIN — IBUPROFEN 600 MG: 600 TABLET ORAL at 01:15

## 2022-05-18 RX ADMIN — Medication: at 01:14

## 2022-05-18 RX ADMIN — ACETAMINOPHEN 1000 MG: 500 TABLET ORAL at 02:55

## 2022-05-18 RX ADMIN — WITCH HAZEL 1 PAD: 500 SOLUTION RECTAL; TOPICAL at 01:15

## 2022-05-18 NOTE — NURSING NOTE
"Dr. Barclay notified patient wanted something more for pain. Ordered 30mg toradol q6h. Nurse went back to room and patient had taken out her IV and said she \"just wants to go home and baby is going up for adoption anyways.\" Jodi Simms notified and came and talked to patient and explained all risks. Patient still wanted to go home. 0320 Dr. Barclay notified patient wants to leave AMA. 0340 patient left after signing paper.  "

## 2022-05-18 NOTE — DISCHARGE SUMMARY
Breana Coleman  [id number]  1990      Pt desires d/c.  Pt self d/c'ed IV.  Pt s/p  at 2307.  Hospital course: pt was admitted  with c/o contractions.  Pt with no PNC.  Labs c/w Hep C+ and UDS pos for cocaine.  Pt had PDC US which showed no anomalies.  Pt subsequently underwent a vaginal delivery w/o complications.  P/1)d/c home     2)d/c meds: none     3)pt has been asked to f/u with G provider, names given    Isaias Barclay MD  2022  03:20 EDT

## 2022-05-18 NOTE — PROGRESS NOTES
Breana Coleman  [id number]  1990      VE rim/-1-0, baby remains strait OP  FHT's reassuring  Contractions adequate  P/1)continue current management    Isaias Barclay MD  5/17/2022  22:11 EDT

## 2022-05-18 NOTE — PAYOR COMM NOTE
Notification of Delivery    Breana Coleman  1990  ID: [id number]    ACC/Utilization Review  Phone: [phone]  Fax: [phone]    [address] [address] [place] [address]    Delivery: Vaginal, Spontaneous     YOB: 2020    Time of Birth:  Gestational Age 11:07 PM   35w0d      Anesthesia:      Delivering clinician: Isaias ALEXANDER High    Forceps?   No   Vacuum? No    Shoulder dystocia present: No                   Delivery narrative:  Pt spont del a female infant, strait OP, cord clamping delayed.  Cord clamped and cut.  Segment of cord for ABG's.  Cord blood obtained and placenta manually extracted intact.  Uterus explored with no palpable fragments.  Cervical laceration repaired with restoration of normal anatomy.  First degree perineal left unrepaired     Infant     Findings: female  infant      Infant observations: Weight: 2745 g (6 lb 0.8 oz)   Length: 20  in  Observations/Comments:        Apgars:   @ 1 minute /       @ 5 minutes   Infant Name:              Placenta, Cord, and Fluid      Placenta delivered  Manual removal  at         Cord:   present.   Nuchal Cord?  no    Cord blood obtained: Yes     Cord gases obtained:             Yes     Cord gas results: Venous:  No results found for: PHCVEN      Arterial:  No results found for: PHCART       Repair     Episiotomy: Not recorded     No    Lacerations: No   Estimated Blood Loss:                 Quantitative Blood Loss:                 Complications  No prenatal care, meconium, Hep C+     Disposition  Mother to Mother Baby/Postpartum  in stable condition currently.  Baby to remains with mom  in stable condition currently.                   Breana Coleman (31 y.o. Female)             Date of Birth   1990    Social Security Number       Address   [address] [place] [address]    Home Phone   [phone]    [facility]   [phone]       Confucianism   None    Marital Status                   "             Admission Date   22    Admission Type   Elective    Admitting Provider   Mauricio Woody MD    Attending Provider       Department, Room/Bed   [facility] MOTHER BABY 4B, N434/1       Discharge Date   2022    Discharge Disposition   Home or Self Care    Discharge Destination                               Attending Provider: (none)   Allergies: No Known Allergies    Isolation: None   Infection: None   Code Status: CPR   Advance Care Planning Activity    Ht: 149.9 cm (59\")   Wt: --    Admission Cmt: None   Principal Problem: None                Active Insurance as of 2022     Primary Coverage     Payor Plan Insurance Group Employer/Plan Group    [facility] MEDICAID      Payor Plan Address Payor Plan Phone Number Payor Plan Fax Number Effective Dates    [address] [address] [phone]  2019 - None Entered    [facility] [id number]       Subscriber Name Subscriber Birth Date Member ID       BREANA SORIANO 1990 [id number]                 Emergency Contacts      (Rel.) Home Phone Work Phone Mobile Phone    CELINE VERA (Significant Other) -- -- [phone]            Problem List           Codes Noted - Resolved       Hospital    Normal labor ICD-10-CM: O80, Z37.9  ICD-9-CM: 650 2022 - Present             History & Physical      Isaias Barclay MD at 22 2213          Breana Chileliris  1990  [id number]  [id number]    CC: contractions  HPI:  Patient is 31 y.o. female   currently at Unknown presents with c/o uterine contractions, onset this am, rates 8/10, denies assoc bleeding or leaking.  Good FM.  PNC comp by tob use and no PNC     PMH:  Current meds: PNV  Illnesses: MRSA  Surgeries: Mult I and D's (~18) for MRSA  Allergies: NKDA    Past OB History:       OB History    Para Term  AB Living   2 1 1 0 0 1   SAB IAB Ectopic Molar Multiple Live Births   0 0 0 0 0 1      # Outcome Date GA Lbr Aaron/2nd Weight Sex Delivery " "Anes PTL Lv   2 Current            1 Term 08/11/12 39w0d  2807 g (6 lb 3 oz) F Vag-Spont EPI N NORMA      Complications: Preeclampsia            SH: tob 1PPD , EtOH neg, drugs neg  FH: heart dz neg , diabetes neg , cancer neg    General ROS: contractions.   All other systems reviewed and are negative.      Physical Examination: General appearance - alert, well appearing, and in no distress  Vital signs - /96   Pulse 107   Temp 98.2 °F (36.8 °C) (Oral)   Resp 18   Ht 149.9 cm (59\")   LMP 07/18/2021 (Approximate)   SpO2 96%   Breastfeeding No   BMI 22.22 kg/m²   HEENT: normocephalic, atraumatic,oropharynx clear, appearance of ears and nose normal  Neck - supple, no significant adenopathy, no thyromegaly  Lymphatics - no palpable lymphadenopathy in the neck or groin, no hepatosplenomegaly  Chest - clear to auscultation, no wheezes, rales or rhonchi, respiratory effort non-labored  Heart - normal rate, regular rhythm, no murmurs, rubs, clicks or gallops, no JVD, no lower extremity edema  Abdomen - soft, nontender, nondistended, no masses, no hepatosplenomegaly  no rebound tenderness noted, bowel sounds normal  Vaginal Exam: 3/80%/-1 (per RN) ,external genitalia normal  Extremities - no pedal edema noted, no calf tend, mult healed skin lesions  Skin -warm and dry, normal coloration and turgor, no rashes, no suspicious skin lesions noted    Fetal monitoring: indication contractions , onset 2110 , offset 2217 , baseline 120-125 , mod BTB variability , multiple accels (15 X 15), one variable decel, irreg contractions, interpretation reactive NST with single variable    Radiology US: AC ~35 weeks, FL ~38 weeks, ant placenta (mature appearing), oligo, anomaly scan not performed due to oligo and ant placenta    Assessment 1)IUP ~37 weeks   2)labor   3)no PNC   4)hx MRSA   5)tob use     Plan 1)admit   2)GBS prophylaxis (since unsure dates)    Isaias Barclay MD  5/16/2022  22:13 EDT                                 "                                      Electronically signed by Isaias Barclay MD at 05/17/22 0145       Vital Signs (last 3 days) before discharge     Date/Time Temp Temp src Pulse Resp BP Patient Position SpO2    05/18/22 0206 98 (36.7) Oral 68 18 133/85 -- --    05/18/22 0100 98 (36.7) Oral 71 16 137/77 -- --    05/18/22 0044 98.3 (36.8) Oral 71 -- 153/92 -- --    05/18/22 0036 -- -- 90 -- 123/78 -- --    05/18/22 0014 98.5 (36.9) Oral 77 -- 134/85 -- --    05/17/22 2344 -- -- 87 -- 123/74 -- --    05/17/22 2329 -- -- 110 -- 123/69 -- --    05/17/22 2314 98.7 (37.1) Oral 111 -- 135/88 -- --    05/17/22 2244 -- -- 108 -- 100/58 -- --    05/17/22 2229 -- -- 118 -- 122/70 -- --    05/17/22 2213 -- -- 90 -- 110/61 -- --    05/17/22 2159 -- -- 83 -- 106/58 -- --    05/17/22 2144 98.4 (36.9) Oral 101 -- 121/82 -- --    05/17/22 2129 -- -- 93 -- 115/77 -- --    05/17/22 2113 -- -- 99 -- 123/79 -- --    05/17/22 2059 -- -- 91 -- 120/75 -- --    05/17/22 2045 -- -- 83 -- 120/83 -- --    05/17/22 2029 -- -- 92 -- 126/80 -- --    05/17/22 2014 -- -- 104 -- 123/81 -- --    05/17/22 1959 -- -- 96 -- 110/64 -- --    05/17/22 1946 -- -- 84 -- 117/71 -- --    05/17/22 1929 -- -- 83 -- 106/62 -- --    05/17/22 1916 98.2 (36.8) Oral 76 20 -- -- --    05/17/22 1914 -- -- 90 -- 148/129 -- --    05/17/22 1859 -- -- 95 -- 115/77 -- --    05/17/22 1745 -- -- 115 -- 115/80 -- --    05/17/22 1730 -- -- 96 -- 119/85 -- --    05/17/22 1715 -- -- 118 -- 112/74 -- --    05/17/22 1700 -- -- 76 -- 116/83 -- --    05/17/22 1645 -- -- 84 -- 107/82 -- --    05/17/22 1630 -- -- 94 -- 115/76 -- --    05/17/22 1615 -- -- 80 -- 115/76 -- --    05/17/22 1600 97.9 (36.6) Oral 83 -- 119/74 -- --    05/17/22 1545 -- -- 84 -- 114/70 -- --    05/17/22 1515 -- -- 69 -- 112/69 -- --    05/17/22 1500 -- -- 73 -- 105/71 -- --    05/17/22 1445 -- -- 93 -- 111/74 -- --    05/17/22 1430 -- -- 66 -- 107/74 -- --    05/17/22 1415 -- -- 67 -- 105/73 -- --    05/17/22  1400 98 (36.7) Oral 61 -- 101/76 -- --    05/17/22 1344 -- -- 68 -- 98/73 -- --    05/17/22 1329 -- -- 62 -- 114/74 -- --    05/17/22 1315 -- -- 80 -- 103/70 -- --    05/17/22 1259 -- -- 80 -- 120/95 -- --    05/17/22 1244 -- -- 70 -- 119/82 -- --    05/17/22 1230 -- -- 70 -- 135/93 -- --    05/17/22 1223 -- -- 71 -- 127/73 -- --    05/17/22 1200 98.3 (36.8) Oral 72 -- 131/86 -- --    05/17/22 1144 -- -- 66 -- 129/87 -- --    05/17/22 1129 -- -- 71 -- 114/80 -- --    05/17/22 1114 -- -- 79 -- 125/80 -- --    05/17/22 1100 -- -- 76 -- 116/79 -- --    05/17/22 1045 -- -- 72 -- 128/85 -- --    05/17/22 1030 -- -- 72 -- 130/87 -- --    05/17/22 1015 -- -- 71 -- 129/90 -- --    05/17/22 1000 98 (36.7) Oral -- -- -- -- --    05/17/22 0959 -- -- 69 -- 119/88 -- --    05/17/22 0944 -- -- 72 -- 119/85 -- --    05/17/22 0929 -- -- 73 -- 120/79 -- --    05/17/22 0916 -- -- 66 -- 133/80 -- --    05/17/22 0859 -- -- 77 -- 105/60 -- --    05/17/22 0829 -- -- 72 -- 85/52 -- --    05/17/22 0814 -- -- 81 -- 107/65 -- --    05/17/22 0800 98.2 (36.8) Oral 10 -- 97/61 -- --    05/17/22 0744 -- -- 76 -- 102/64 -- --    05/17/22 0729 -- -- 85 -- 110/68 -- --    05/17/22 0714 -- -- 85 -- 101/61 -- --    05/17/22 0700 97.7 (36.5) Oral -- -- -- -- --    05/17/22 0644 -- -- 83 -- 117/78 -- --    05/17/22 0632 -- -- 101 -- 92/51 -- --    05/17/22 0613 -- -- 83 -- 107/63 -- --    05/17/22 0600 -- -- 87 -- 105/62 -- --    05/17/22 0542 -- -- 104 -- 109/65 -- --    05/17/22 0539 -- -- 122 -- 119/77 -- --    05/17/22 0536 -- -- 100 -- 121/80 -- --    05/17/22 0533 -- -- 112 -- 116/71 -- --    05/17/22 0530 -- -- 103 -- 112/72 -- --    05/17/22 0524 -- -- 107 -- 123/77 -- --    05/17/22 0521 -- -- 107 -- 135/87 -- --    05/17/22 0518 -- -- 106 -- 123/84 -- --    05/17/22 0515 -- -- 96 -- 139/95 -- --    05/17/22 0350 97.7 (36.5) Oral 92 16 124/73 Lying --    05/17/22 0014 98 (36.7) Oral 93 16 121/70 Lying --    05/16/22 2130 -- -- -- -- 130/92 --  --    Comment rows:    OBSERV: BS US performed at 05/16/22 2130 05/16/22 2119 98.2 (36.8) Oral 107 18 128/96 -- 96            Current Facility-Administered Medications   Medication Dose Route Frequency Provider Last Rate Last Admin   • acetaminophen (TYLENOL) tablet 1,000 mg  1,000 mg Oral Q4H PRN High, Isaias ALEXANDER MD   1,000 mg at 05/18/22 0255   • benzocaine (AMERICAINE) 20 % rectal ointment 1 application  1 application Rectal PRN High, Isaias ALEXANDER MD       • benzocaine-menthol (DERMOPLAST) 20-0.5 % topical spray   Topical PRN High, Isaias ALEXANDER MD   Given at 05/18/22 0114   • bisacodyl (DULCOLAX) suppository 10 mg  10 mg Rectal Daily PRN High, Isaias ALEXANDER MD       • docusate sodium (COLACE) capsule 100 mg  100 mg Oral BID High, Isaias ALEXANDER MD       • ePHEDrine Sulfate 5 MG/ML injection  - ADS Override Pull            • erythromycin (ROMYCIN) 5 MG/GM ophthalmic ointment  - ADS Override Pull            • Hydrocortisone (Perianal) (ANUSOL-HC) 2.5 % rectal cream 1 application  1 application Rectal PRN High, Isaias ALEXANDER MD       • hydrOXYzine (ATARAX) tablet 50 mg  50 mg Oral Nightly PRN High, Isaias ALEXANDER MD       • ibuprofen (ADVIL,MOTRIN) tablet 600 mg  600 mg Oral Q6H PRN High, Isaias ALEXANDER MD   600 mg at 05/18/22 0115   • ketorolac (TORADOL) injection 30 mg  30 mg Intravenous Q6H PRN High, Isaias ALEXANDER MD       • lactated ringers infusion  125 mL/hr Intravenous Continuous High, Isaias ALEXANDER  mL/hr at 05/17/22 1620 125 mL/hr at 05/17/22 1620   • lanolin topical 1 application  1 application Topical Q1H PRN High, Isaias ALEXANDER MD       • magnesium hydroxide (MILK OF MAGNESIA) suspension 10 mL  10 mL Oral Daily PRN High, Isaias ALEXANDER MD       • Morphine sulfate (PF) injection 4 mg  4 mg Intravenous Q2H PRN High, Isaias ALEXANDER MD   4 mg at 05/17/22 0424   • nicotine (NICODERM CQ) 21 MG/24HR patch 1 patch  1 patch Transdermal Q24H Isaias Barclay MD   1 patch at 05/17/22 0208   • oxytocin (PITOCIN) 30 units in 0.9% sodium chloride 500 mL  (premix)  2-24 natasha-units/min Intravenous Titrated Isaias Barclay MD 10 mL/hr at 05/17/22 2239 10 natasha-units/min at 05/17/22 2239   • ropivacaine (NAROPIN) 0.2 % injection  15 mL/hr Epidural Continuous Chris Carbajal DO 15 mL/hr at 05/17/22 1945 15 mL/hr at 05/17/22 1945   • sodium chloride 0.9 % flush 1-10 mL  1-10 mL Intravenous PRN Isaias Barclay MD       • witch hazel-glycerin (TUCKS) pad 1 pad  1 each Topical PRN Isaias Barclay MD   1 pad at 05/18/22 0115     Current Outpatient Medications   Medication Sig Dispense Refill   • Prenatal Vit-Fe Fumarate-FA (Prenatal 27-1) 27-1 MG tablet tablet Take  by mouth Daily.     • cephalexin (KEFLEX) 500 MG capsule Take 1 capsule by mouth 3 (Three) Times a Day. 21 capsule 0   • fluconazole (DIFLUCAN) 150 MG tablet Take one now and then one in a week. 2 tablet 0   • mupirocin (BACTROBAN) 2 % ointment Apply  topically to the appropriate area as directed 3 (Three) Times a Day. 30 g 0     Lab Results (last 72 hours)     Procedure Component Value Units Date/Time    Blood Gas, Arterial, Cord [[id number]]  (Abnormal) Collected: 05/17/22 2348    Specimen: Cord Blood Arterial from Umbilical Cord Updated: 05/17/22 2348     Site Umbilical     pH, Cord Arterial 7.17 pH Units      Comment: 85 Value below critical limit        pCO2, Cord Arterial 65.8 mmHg      pO2, Cord Arterial 8.7 mmHg      HCO3, Cord Arterial 23.9 mmol/L      Base Exc, Cord Arterial -6.3 mmol/L      O2 Sat, Cord Arterial 7.7 %      Hemoglobin, Blood Gas 16.7 g/dL      CO2 Content 25.9 mmol/L      Temperature 37.0 C      Barometric Pressure for Blood Gas --     Comment: N/A        Modality Room Air     FIO2 21 %      Rate 0 Breaths/minute      PIP 0 cmH2O      Comment: Meter: [id number]     :  [id number]        IPAP 0     EPAP 0     Note --     Notified Deangelo Delgadillo RN     Notified By [id number]     Notified Time 05/17/2022 23:58    Blood Gas, Venous, Cord [[id number]]  (Abnormal) Collected: 05/17/22  2347    Specimen: Cord Blood Venous from Umbilical Cord Updated: 05/17/22 2347     Site Umbilical     pH, Cord Venous 7.259 pH Units      pCO2, Cord Venous 49.2 mm Hg      pO2, Cord Venous 22.4 mm Hg      HCO3, Cord Venous 22.0 mmol/L      Base Excess, Cord Venous -5.5 mmol/L      O2 Sat, Cord Venous 45.4 %      Hemoglobin, Blood Gas 16.6 g/dL      CO2 Content 23.5 mmol/L      Temperature 37.0 C      Barometric Pressure for Blood Gas --     Comment: N/A        Modality Room Air     FIO2 21 %      Ventilator Mode       Rate 0 Breaths/minute      PIP 0 cmH2O      Comment: Meter: [id number]     :  [id number]        IPAP 0     EPAP 0     O2 Saturation Calculated --     Comment: Calculated O2 saturation result not reported at this site.        Note --     Collection Time --    RPR [[id number]]  (Normal) Collected: 05/17/22 0205    Specimen: Blood Updated: 05/17/22 2027     RPR Non-Reactive    CBC & Differential [[id number]]  (Abnormal) Collected: 05/17/22 0258    Specimen: Blood Updated: 05/17/22 0433    Narrative:      The following orders were created for panel order CBC & Differential.  Procedure                               Abnormality         Status                     ---------                               -----------         ------                     CBC Auto Differential[[id number]]        Abnormal            Final result                 Please view results for these tests on the individual orders.    CBC Auto Differential [[id number]]  (Abnormal) Collected: 05/17/22 0258    Specimen: Blood Updated: 05/17/22 0433     WBC 16.91 10*3/mm3      RBC 3.93 10*6/mm3      Hemoglobin 11.5 g/dL      Hematocrit 34.4 %      MCV 87.5 fL      MCH 29.3 pg      MCHC 33.4 g/dL      RDW 14.4 %      RDW-SD 46.1 fl      MPV 10.3 fL      Platelets 360 10*3/mm3      Neutrophil % 80.4 %      Lymphocyte % 11.1 %      Monocyte % 7.6 %      Eosinophil % 0.4 %      Basophil % 0.1 %      Immature Grans % 0.4 %      Neutrophils,  Absolute 13.59 10*3/mm3      Lymphocytes, Absolute 1.88 10*3/mm3      Monocytes, Absolute 1.29 10*3/mm3      Eosinophils, Absolute 0.06 10*3/mm3      Basophils, Absolute 0.02 10*3/mm3      Immature Grans, Absolute 0.07 10*3/mm3      nRBC 0.0 /100 WBC     HIV-1 & HIV-2 Antibodies [[id number]]  (Normal) Collected: 05/17/22 0205    Specimen: Blood Updated: 05/17/22 0330    Narrative:      The following orders were created for panel order HIV-1 & HIV-2 Antibodies.  Procedure                               Abnormality         Status                     ---------                               -----------         ------                     HIV-1 / O / 2 Ag / Antib...[[id number]]  Normal              Final result                 Please view results for these tests on the individual orders.    HIV-1 / O / 2 Ag / Antibody 4th Generation [[id number]]  (Normal) Collected: 05/17/22 0205    Specimen: Blood Updated: 05/17/22 0330     HIV-1/ HIV-2 Non-Reactive    Narrative:      The HIV antibody/antigen combo assay is a qualitative assay for HIV that includes the p24 antigen as well as antibodies to HIV types 1 and 2. This test is intended to be used as a screening assay in the diagnosis of HIV infection in patients over the age of 2.  Results may be falsely decreased if patient taking Biotin.      Hepatitis C Antibody [[id number]]  (Abnormal) Collected: 05/17/22 0205    Specimen: Blood Updated: 05/17/22 0326     Hepatitis C Ab Reactive    Narrative:      Results may be falsely decreased if patient taking Biotin.      Hepatitis B Surface Antigen [[id number]]  (Normal) Collected: 05/17/22 0205    Specimen: Blood Updated: 05/17/22 0301     Hepatitis B Surface Ag Non-Reactive    Preeclampsia Panel [[id number]]  (Abnormal) Collected: 05/17/22 0205    Specimen: Blood Updated: 05/17/22 0239     Alkaline Phosphatase 338 U/L      ALT (SGPT) 8 U/L      AST (SGOT) 16 U/L      Creatinine 0.64 mg/dL      Total Bilirubin 0.3 mg/dL       U/L       Comment: Specimen hemolyzed.  Results may be affected.        Uric Acid 4.5 mg/dL     Rubella Antibody, IgG [[id number]] Collected: 05/17/22 0205    Specimen: Blood Updated: 05/17/22 0211    COVID PRE-OP / PRE-PROCEDURE SCREENING ORDER (NO ISOLATION) - Swab, Nasopharynx [[id number]]  (Normal) Collected: 05/17/22 0010    Specimen: Swab from Nasopharynx Updated: 05/17/22 0053    Narrative:      The following orders were created for panel order COVID PRE-OP / PRE-PROCEDURE SCREENING ORDER (NO ISOLATION) - Swab, Nasopharynx.  Procedure                               Abnormality         Status                     ---------                               -----------         ------                     COVID-19, ABBOTT IN-HOUS...[[id number]]  Normal              Final result                 Please view results for these tests on the individual orders.    COVID-19, ABBOTT IN-HOUSE,NASAL Swab (NO TRANSPORT MEDIA) 2 HR TAT - Swab, Nasopharynx [[id number]]  (Normal) Collected: 05/17/22 0010    Specimen: Swab from Nasopharynx Updated: 05/17/22 0053     COVID19 Presumptive Negative    Narrative:      Fact sheet for providers: https://www.fda.gov/media/991165/download     Fact sheet for patients: https://www.fda.gov/media/028758/download    Test performed by PCR.  If inconsistent with clinical signs and symptoms patient should be tested with different authorized molecular test.    Urine Drug Screen - Urine, Clean Catch [[id number]]  (Abnormal) Collected: 05/16/22 2339    Specimen: Urine, Clean Catch Updated: 05/17/22 0009     THC, Screen, Urine Negative     Phencyclidine (PCP), Urine Negative     Cocaine Screen, Urine Positive     Methamphetamine, Ur Negative     Opiate Screen Negative     Amphetamine Screen, Urine Negative     Benzodiazepine Screen, Urine Negative     Tricyclic Antidepressants Screen Negative     Methadone Screen, Urine Negative     Barbiturates Screen, Urine Negative     Oxycodone Screen, Urine Negative      "Propoxyphene Screen Negative     Buprenorphine, Screen, Urine Negative    Narrative:      Cutoff For Drugs Screened:    Amphetamines               500 ng/ml  Barbiturates               200 ng/ml  Benzodiazepines            150 ng/ml  Cocaine                    150 ng/ml  Methadone                  200 ng/ml  Opiates                    100 ng/ml  Phencyclidine               25 ng/ml  THC                            50 ng/ml  Methamphetamine            500 ng/ml  Tricyclic Antidepressants  300 ng/ml  Oxycodone                  100 ng/ml  Propoxyphene               300 ng/ml  Buprenorphine               10 ng/ml    The normal value for all drugs tested is negative. This report includes unconfirmed screening results, with the cutoff values listed, to be used for medical treatment purposes only.  Unconfirmed results must not be used for non-medical purposes such as employment or legal testing.  Clinical consideration should be applied to any drug of abuse test, particularly when unconfirmed results are used.      Cocaine, Urine, Confirmation - Urine, Clean Catch [[id number]] Collected: 05/16/22 2339    Specimen: Urine, Clean Catch Updated: 05/17/22 0000          Orders (last 72 hrs)      Start     Ordered    05/18/22 0900  docusate sodium (COLACE) capsule 100 mg  2 Times Daily         05/18/22 0105 05/18/22 0800  Sitz Bath  3 Times Daily        Comments: PRN    05/18/22 0105 05/18/22 0600  oxytocin (PITOCIN) 30 units in 0.9% sodium chloride 500 mL (premix)  Once        \"Followed by\" Linked Group Details    05/17/22 2352 05/18/22 0600  oxytocin (PITOCIN) 30 units in 0.9% sodium chloride 500 mL (premix)  Once        \"Followed by\" Linked Group Details    05/17/22 2352 05/18/22 0600  CBC & Differential  Timed        Comments: Postpartum Day 1      05/18/22 0105 05/18/22 0600  Hemoglobin & Hematocrit, Blood  Timed,   Status:  Canceled        Comments: Postpartum Day 1      05/18/22 0105 05/18/22 0600  CBC Auto " Differential  PROCEDURE ONCE        Comments: Postpartum Day 1      05/18/22 0106    05/18/22 0323  Discharge patient  Once         05/18/22 0323    05/18/22 0303  ketorolac (TORADOL) injection 30 mg  Every 6 Hours PRN         05/18/22 0304    05/18/22 0106  Code Status and Medical Interventions:  Continuous         05/18/22 0105 05/18/22 0106  Vital Signs Per Hospital Policy  Per Hospital Policy         05/18/22 0105 05/18/22 0106  Notify Physician  Until Discontinued         05/18/22 0105 05/18/22 0106  Up Ad Sara  Until Discontinued         05/18/22 0105 05/18/22 0106  Advance Diet as Tolerated  Until Discontinued         05/18/22 0105 05/18/22 0106  Fundal and Lochia Check  Per Order Details        Comments: Every 30 minutes x2, then Every Shift    05/18/22 0105 05/18/22 0106  RN to Assess Rh Status & Place RhIG Evaluation Order if Indicated  Continuous         05/18/22 0105 05/18/22 0106  Bladder Assessment  Per Order Details        Comments: Postpartum 1) Upon Admission to Unit & Every 4 Hours PRN Until Voiding. 2) Out of Bed to Void in 8 Hours.    05/18/22 0105 05/18/22 0106  Straight Cath  Per Order Details        Comments: Postpartum: If Distended & Unable to Void, May Repeat Once.    05/18/22 0105 05/18/22 0106  Indwelling Urinary Catheter  Per Order Details        Comments: Postpartum : After Straight Cathed x2 or if Greater Than 1000mL Residual, Insert Indwelling Urinary Catheter Until Further MD Order.    05/18/22 0105 05/18/22 0106  Remove Vaginal Packing  Once         05/18/22 0105 05/18/22 0106  Apply Ice to Perineum  Per Order Details        Comments: For 20 Minutes Every 2 Hours    05/18/22 0105 05/18/22 0106  Waffle Cushion  Per Order Details        Comments: For Perineal Discomfort    05/18/22 0105 05/18/22 0106  Donut Ring  Per Order Details        Comments: For Perineal Pain    05/18/22 0105 05/18/22 0106  Kpad  Per Order Details        Comments: For  Pain    05/18/22 0105    05/18/22 0106  Breast pump to bed  Once         05/18/22 0105    05/18/22 0106  If indicated -- Please administer RH Immunoglobulin based on results of cord blood evaluation and fetal screen lab tests, pharmacy to dispense  Per Order Details        Comments: See Process Instructions For Reference Range Details.    05/18/22 0105    05/18/22 0105  magnesium hydroxide (MILK OF MAGNESIA) suspension 10 mL  Daily PRN         05/18/22 0105    05/18/22 0105  lanolin topical 1 application  Every 1 Hour PRN         05/18/22 0105    05/18/22 0105  benzocaine-menthol (DERMOPLAST) 20-0.5 % topical spray  As Needed         05/18/22 0105    05/18/22 0105  witch hazel-glycerin (TUCKS) pad 1 pad  As Needed         05/18/22 0105    05/18/22 0105  Hydrocortisone (Perianal) (ANUSOL-HC) 2.5 % rectal cream 1 application  As Needed         05/18/22 0105    05/18/22 0105  benzocaine (AMERICAINE) 20 % rectal ointment 1 application  As Needed         05/18/22 0105    05/18/22 0105  acetaminophen (TYLENOL) tablet 1,000 mg  Every 4 Hours PRN         05/18/22 0105    05/18/22 0105  hydrOXYzine (ATARAX) tablet 50 mg  Nightly PRN         05/18/22 0105    05/18/22 0105  Ambulate Patient  Every Shift       05/18/22 0105    05/18/22 0105  sodium chloride 0.9 % flush 1-10 mL  As Needed         05/18/22 0105    05/18/22 0105  ibuprofen (ADVIL,MOTRIN) tablet 600 mg  Every 6 Hours PRN         05/18/22 0105    05/18/22 0105  bisacodyl (DULCOLAX) suppository 10 mg  Daily PRN         05/18/22 0105    05/17/22 2353  Notify Provider (Specified)  Until Discontinued         05/17/22 2352 05/17/22 2353  Vital Signs Per Hospital Policy  Per Hospital Policy         05/17/22 2352 05/17/22 2353  Fundal & Lochia Check  Per Order Details        Comments: Every 15 Minutes x4, Then Every 30 Minutes x2, Then Every Shift    05/17/22 2352 05/17/22 6493  Diet Regular  Diet Effective Now         05/17/22 2352 05/17/22 6673  Nurse May  "Remove Epidural Catheter After Delivery  Continuous         22 2352    22 2353  Transfer to postpartum when discharge criteria met.  Until Discontinued         22 23522 235  Fundal & Lochia Check  Every Shift       22 23522  methylergonovine (METHERGINE) injection 200 mcg  Once As Needed,   Status:  Discontinued         22 23522 235  carboprost (HEMABATE) injection 250 mcg  As Needed,   Status:  Discontinued         22 23522 235  miSOPROStol (CYTOTEC) tablet 800 mcg  As Needed,   Status:  Discontinued         22 23522 234  Blood Gas, Arterial, Cord  PROCEDURE ONCE         22 23422 234  Blood Gas, Venous, Cord  PROCEDURE ONCE         22 23422  Blood Gas, Arterial -With Co-Ox Panel: Yes  STAT         22 2328    22 232  Transfer Patient  Once         22 23222 232  VTE Prophylaxis Not Indicated: No Risk Factors (0); </= 3 (Low Risk)  Once         22 2329    22 232  Blood Gas, Venous, Cord  Once         22 2328    22 232  Tissue Pathology Exam  Once         22 2327    22 1900  acetaminophen (TYLENOL) tablet 1,000 mg  Once         22 1853    22 1030  lactated ringers bolus 300 mL  Once         22 0944    22 0945  Amnioinfusion Through IUPC  Once         22 0944    22 0650  [facility]  1 Time Imaging         22 0649    22 0600  penicillin G potassium 3 Million Units in sodium chloride 0.9 % 50 mL IVPB  Every 4 Hours,   Status:  Discontinued        \"Followed by\" Linked Group Details    22 0212    22 0600  ropivacaine (NAROPIN) 0.2 % injection  Continuous         22 0500    22 0600  Sod Citrate-Citric Acid (BICITRA) solution 30 mL  Once,   Status:  Discontinued         22 0500    22 0500  Vital Signs Per " "Anesthesia Guidelines  Per Hospital Policy        Comments: Every 3 Minutes x 20 Minutes following epidural dosing, then if stable every 15 Minutes    05/17/22 0500    05/17/22 0500  Start IV (16 or 18 Gauge)  Once         05/17/22 0500    05/17/22 0500  Fetal Heart Rate Monitor  Once         05/17/22 0500    05/17/22 0500  Nurse or Anesthesiologist to Remain With Patient for 15 Minutes Following Dosing  Continuous         05/17/22 0500    05/17/22 0500  Facilitate Maternal Position on Side & Maintain Uterine Displacement  Continuous         05/17/22 0500    05/17/22 0500  Consult Anesthesia Prior to Changing Epidural Infusion / Rate  Continuous         05/17/22 0500    05/17/22 0500  lactated ringers bolus 1,000 mL  As Needed,   Status:  Discontinued         05/17/22 0500    05/17/22 0500  ePHEDrine Sulfate 5 MG/ML injection 10 mg  Every 10 Minutes PRN,   Status:  Discontinued         05/17/22 0500    05/17/22 0500  diphenhydrAMINE (BENADRYL) injection 12.5 mg  Every 8 Hours PRN,   Status:  Discontinued         05/17/22 0500    05/17/22 0500  metoclopramide (REGLAN) injection 10 mg  Once As Needed         05/17/22 0500    05/17/22 0500  ondansetron (ZOFRAN) injection 4 mg  Once As Needed,   Status:  Discontinued         05/17/22 0500    05/17/22 0500  famotidine (PEPCID) injection 20 mg  Once As Needed,   Status:  Discontinued         05/17/22 0500    05/17/22 0231  penicillin G potassium 3 Million Units in sodium chloride 0.9 % 50 mL IVPB  Every 4 Hours,   Status:  Discontinued        \"Followed by\" Linked Group Details    05/16/22 2147 05/17/22 0230  oxytocin (PITOCIN) 30 units in 0.9% sodium chloride 500 mL (premix)  Titrated         05/17/22 0224    05/17/22 0220  Case Management  Consult  Once        Provider:  (Not yet assigned)    05/17/22 0220    05/17/22 0150  Morphine sulfate (PF) injection 4 mg  Every 2 Hours PRN         05/17/22 0150    05/17/22 0115  nicotine (NICODERM CQ) 21 MG/24HR " "patch 1 patch  Every 24 Hours Scheduled         05/17/22 0020    05/17/22 0030  Chlamydia trachomatis, PCR - Urine, Urine, Clean Catch  Once        \"And\" Linked Group Details    05/17/22 0029    05/17/22 0030  RPR  Once        \"And\" Linked Group Details    05/17/22 0029    05/17/22 0030  Rubella Antibody, IgG  Once        \"And\" Linked Group Details    05/17/22 0029    05/17/22 0030  Hepatitis B Surface Antigen  Once        \"And\" Linked Group Details    05/17/22 0029    05/17/22 0030  Hepatitis C Antibody  Once        \"And\" Linked Group Details    05/17/22 0029    05/17/22 0030  CBC & Differential  Once        \"And\" Linked Group Details    05/17/22 0029    05/17/22 0030  HIV-1 & HIV-2 Antibodies  Once        \"And\" Linked Group Details    05/17/22 0029    05/17/22 0030  CBC Auto Differential  PROCEDURE ONCE         05/17/22 0029    05/17/22 0030  HIV-1 / O / 2 Ag / Antibody 4th Generation  PROCEDURE ONCE         05/17/22 0029    05/17/22 0024  ePHEDrine Sulfate 5 MG/ML injection  - ADS Override Pull        Note to Pharmacy: Created by cabinet override    05/17/22 0024    05/17/22 0024  erythromycin (ROMYCIN) 5 MG/GM ophthalmic ointment  - ADS Override Pull        Note to Pharmacy: Created by cabinet override    05/17/22 0024    05/17/22 0001  Cocaine, Urine, Confirmation - Urine, Clean Catch  Once         05/17/22 0000    05/16/22 2245  sodium chloride 0.9 % flush 3 mL  Every 12 Hours Scheduled,   Status:  Discontinued         05/16/22 2147 05/16/22 2245  lactated ringers infusion  Continuous         05/16/22 2147 05/16/22 2245  mineral oil liquid 30 mL  Once,   Status:  Discontinued         05/16/22 2147 05/16/22 2245  penicillin g 5 mu/100 mL 0.9% NS IVPB (mbp)  Once        \"Followed by\" Linked Group Details    05/16/22 2147 05/16/22 2236  COVID PRE-OP / PRE-PROCEDURE SCREENING ORDER (NO ISOLATION) - Swab, Nasopharynx  Once         05/16/22 2236 05/16/22 2236  COVID-19, ABBOTT IN-HOUSE,NASAL Swab (NO " "TRANSPORT MEDIA) 2 HR TAT - Swab, Nasopharynx  PROCEDURE ONCE         22  ondansetron (ZOFRAN) tablet 4 mg  Every 6 Hours PRN,   Status:  Discontinued        \"Or\" Linked Group Details    22  ondansetron (ZOFRAN) injection 4 mg  Every 6 Hours PRN,   Status:  Discontinued        \"Or\" Linked Group Details    22  butorphanol (STADOL) injection 2 mg  Every 2 Hours PRN,   Status:  Discontinued         22  butorphanol (STADOL) injection 1 mg  Every 2 Hours PRN,   Status:  Discontinued         22  Initiate Group Beta Strep (GBS) Prophylaxis Protocol, If Criteria Met  Continuous        Comments: NO TREATMENT RECOMMENDED IF: 1) Maternal GBS Status Known Negative 2) Scheduled  Birth With Intact Membranes, Not in Labor 3) Maternal GBS Status Unknown, No Risk Factors  TREAT WITH ANTIBIOTICS IF:  1) Maternal GBS Status Known Positive 2) Maternal GBS Status Unknown With Risk Factors: a)  Previous Infant Affected By GBS Infection b) GBS Urinary Tract Infection (UTI) or Bacteriuria During Pregnancy c) Unexplained Maternal Fever (100.4F (38C) or Greater) During Labor d)  Prolonged Rupture re of Membranes (18 or More Hours) e) Gestational Age Less Than 37 Weeks    22  Diet Clear Liquid  Diet Effective Now,   Status:  Canceled         22  Chlamydia trachomatis, PCR - Swab, Vagina  Once,   Status:  Canceled        \"And\" Linked Group Details    22  Urine Drug Screen - Urine, Clean Catch  Once        \"And\" Linked Group Details    22  RPR  Once,   Status:  Canceled        \"And\" Linked Group Details    22  Rubella Antibody, IgG  Once,   Status:  Canceled        \"And\" Linked Group Details    22  Hepatitis B Surface Antigen  Once,   " "Status:  Canceled        \"And\" Linked Group Details    05/16/22 2147 05/16/22 2146  Hepatitis C Antibody  Once,   Status:  Canceled        \"And\" Linked Group Details    05/16/22 2147 05/16/22 2146  CBC & Differential  Once,   Status:  Canceled        \"And\" Linked Group Details    05/16/22 2147 05/16/22 2146  Type & Screen  Once,   Status:  Canceled        \"And\" Linked Group Details    05/16/22 2147 05/16/22 2146  HIV-1 & HIV-2 Antibodies  Once,   Status:  Canceled        \"And\" Linked Group Details    05/16/22 2147 05/16/22 2146  Preeclampsia Panel  Once         05/16/22 2147 05/16/22 2146  CBC Auto Differential  PROCEDURE ONCE,   Status:  Canceled         05/16/22 2148 05/16/22 2146  HIV-1 / O / 2 Ag / Antibody 4th Generation  PROCEDURE ONCE,   Status:  Canceled         05/16/22 2148 05/16/22 2144  Code Status and Medical Interventions:  Continuous,   Status:  Canceled         05/16/22 2147 05/16/22 2144  VTE Prophylaxis Not Indicated: No Risk Factors (0); </= 3 (Low Risk)  Once         05/16/22 2147 05/16/22 2143  Admit To Obstetrics Inpatient  Once         05/16/22 2147 05/16/22 2143  Obtain Informed Consent  Once         05/16/22 2147 05/16/22 2143  Vital Signs Per Hospital Policy  Per Hospital Policy         05/16/22 2147 05/16/22 2143  Mini-Prep Prior to Delivery  Once         05/16/22 2147 05/16/22 2143  Continuous Fetal Monitoring With NST on Admission and Prior to Initiation of Oxytocin.  Per Order Details        Comments: Continuous Fetal Monitoring With NST on Admission & Prior to Initiation of Oxytocin.    05/16/22 2147 05/16/22 2143  External Uterine Contraction Monitoring  Per Hospital Policy         05/16/22 2147 05/16/22 2143  Notify Provider (Specified)  Until Discontinued         05/16/22 2147 05/16/22 2143  Notify Provider of Tachysystole (Per Hospital Algorithm)  Until Discontinued         05/16/22 2147 05/16/22 2143  Notify Provider if " Membranes Ruptured, Bleeding Greater Than 1 Pad Per Hour, Fetal Heart Tone Abnormality or Severe Pain  Until Discontinued         05/16/22 2147 05/16/22 2143  CBC (No Diff)  Once,   Status:  Canceled         05/16/22 2147 05/16/22 2143  Type & Screen  Once         05/16/22 2147 05/16/22 2143  Insert Peripheral IV  Once         05/16/22 2147 05/16/22 2143  Saline Lock & Maintain IV Access  Continuous         05/16/22 2147 05/16/22 2142  lidocaine PF 1% (XYLOCAINE) injection 5 mL  As Needed,   Status:  Discontinued         05/16/22 2147 05/16/22 2142  sodium chloride 0.9 % flush 10 mL  As Needed,   Status:  Discontinued         05/16/22 2147 05/16/22 2142  lactated ringers bolus 1,000 mL  Once As Needed         05/16/22 2147    Unscheduled  Position Change - For Intra-Uterine Resusitation for Hypertonus, HyperStimulation or Non-Reassuring Fetal Status  As Needed       05/16/22 2147    Unscheduled  Warm compress  As Needed       05/18/22 0105    Unscheduled  Apply ace wrap, tight bra, or binder  As Needed       05/18/22 0105    Unscheduled  Apply ice packs  As Needed       05/18/22 0105    --  Prenatal Vit-Fe Fumarate-FA (Prenatal 27-1) 27-1 MG tablet tablet  Daily         05/16/22 2111                   Physician Progress Notes (last 72 hours)      Isaias Barclay MD at 05/17/22 2211          Breana Jared  [id number]  1990      VE rim/-1-0, baby remains strait OP  FHT's reassuring  Contractions adequate  P/1)continue current management    Isaias Barclay MD  5/17/2022  22:11 EDT        Electronically signed by Isaias Barclay MD at 05/17/22 2219     Narinder Gilliam DO at 05/17/22 1904        Laborist    SVE 8/90/-1    Mod variability  Variable decels   amnioinfusion 300ml          Electronically signed by Narinder Gilliam DO at 05/17/22 1906     Narinder Gilliam DO at 05/17/22 1304        LAborist    FHR cat 1  SVE  5/80/-1  ROP  CTX 3-6 min and inadequate  contractions.  PLAN  1.  Continue Pitocin augmentation    Electronically signed by Narinder Gilliam DO at 22 1306     Isaias Barclay MD at 22 0658          Breana Coleman  [id number]  1990      VE 4-5/80%/-1  IUPC placed  Labs Hep C+ (pt did know this)  UDS + for cocaine, pt denies ever using  P/1)hold pitocin until PDC scan (to r/o any reason for vaginal delivery)    Isaias Barclay MD  2022  06:58 EDT        Electronically signed by Isaias Barclay MD at 22 0659     Isaias Barclay MD at 22 0145          Breana Colmean  [id number]  1990      VE 4/80%/-1  FHT's reassuring.  IV now in.  P/1)oxytocin per protocol    Isaias Barclay MD  2022  01:45 EDT        Electronically signed by Isaias Barclay MD at 22 0146          Discharge Summary      Isaias Barclay MD at 22 0320          Breana Coleman  [id number]  1990      Pt desires d/c.  Pt self d/c'ed IV.  Pt s/p  at 2307.  Hospital course: pt was admitted  with c/o contractions.  Pt with no PNC.  Labs c/w Hep C+ and UDS pos for cocaine.  Pt had PDC US which showed no anomalies.  Pt subsequently underwent a vaginal delivery w/o complications.  P/1)d/c home     2)d/c meds: none     3)pt has been asked to f/u with G provider, names given    Isaias Barclay MD  2022  03:20 EDT        Electronically signed by Isaias Barclay MD at 22 032

## 2022-05-18 NOTE — L&D DELIVERY NOTE
[facility]  Vaginal Delivery Note   Review the Delivery Report for details.       Delivery     Delivery: Vaginal, Spontaneous     YOB: 2020    Time of Birth:  Gestational Age 11:07 PM   35w0d     Anesthesia:      Delivering clinician: Isaias Barclay    Forceps?   No   Vacuum? No    Shoulder dystocia present: No        Delivery narrative:  Pt spont del a female infant, strait OP, cord clamping delayed.  Cord clamped and cut.  Segment of cord for ABG's.  Cord blood obtained and placenta manually extracted intact.  Uterus explored with no palpable fragments.  Cervical laceration repaired with restoration of normal anatomy.  First degree perineal left unrepaired    Infant    Findings: female  infant     Infant observations: Weight: 2745 g (6 lb 0.8 oz)   Length: 20  in  Observations/Comments:        Apgars:   @ 1 minute /      @ 5 minutes   Infant Name:      Placenta, Cord, and Fluid    Placenta delivered  Manual removal  at        Cord:   present.   Nuchal Cord?  no   Cord blood obtained: Yes    Cord gases obtained:  Yes    Cord gas results: Venous:  No results found for: PHCVEN    Arterial:  No results found for: PHCART     Repair    Episiotomy: Not recorded     No    Lacerations: No   Estimated Blood Loss:               Quantitative Blood Loss:                  Complications  No prenatal care, meconium, Hep C+    Disposition  Mother to Mother Baby/Postpartum  in stable condition currently.  Baby to remains with mom  in stable condition currently.      Isaias Barclay MD  05/17/22  23:24 EDT

## 2022-05-19 LAB
CYTO UR: NORMAL
LAB AP CASE REPORT: NORMAL
LAB AP CLINICAL INFORMATION: NORMAL
PATH REPORT.FINAL DX SPEC: NORMAL
PATH REPORT.GROSS SPEC: NORMAL

## 2022-05-20 LAB — REF LAB TEST METHOD: NORMAL

## 2022-08-08 ENCOUNTER — TELEMEDICINE (OUTPATIENT)
Dept: FAMILY MEDICINE CLINIC | Facility: TELEHEALTH | Age: 32
End: 2022-08-08

## 2022-08-08 DIAGNOSIS — K04.7 DENTAL ABSCESS: Primary | ICD-10-CM

## 2022-08-08 DIAGNOSIS — R21 RASH IN ADULT: ICD-10-CM

## 2022-08-08 PROCEDURE — 99213 OFFICE O/P EST LOW 20 MIN: CPT | Performed by: NURSE PRACTITIONER

## 2022-08-08 RX ORDER — METHYLPREDNISOLONE 4 MG/1
TABLET ORAL
Qty: 21 TABLET | Refills: 0 | Status: SHIPPED | OUTPATIENT
Start: 2022-08-08

## 2022-08-08 RX ORDER — CLINDAMYCIN HYDROCHLORIDE 300 MG/1
300 CAPSULE ORAL 3 TIMES DAILY
Qty: 21 CAPSULE | Refills: 0 | Status: SHIPPED | OUTPATIENT
Start: 2022-08-08 | End: 2022-08-15

## 2022-08-08 NOTE — PROGRESS NOTES
CHIEF COMPLAINT  Chief Complaint   Patient presents with   • Dental Problem   • Rash         HPI  Breana Garza is a 31 y.o. female  presents with complaint of dental abscess in left lower jaw with facial swelling and an itchy rash on right are that has been there for 1-2 weeks.   She is trying to find a dentist and will call tomorrow.   She has tried different creams on her rash with no improvement.     Review of Systems   Constitutional: Negative for chills and fever.   HENT: Positive for dental problem and facial swelling. Negative for sore throat.    Skin: Positive for rash (right arm ).       History reviewed. No pertinent past medical history.    No family history on file.    Social History     Socioeconomic History   • Marital status:    Tobacco Use   • Smoking status: Current Every Day Smoker     Packs/day: 1.00     Years: 7.00     Pack years: 7.00     Types: Cigarettes   • Smokeless tobacco: Never Used   • Tobacco comment: See chart notes   Vaping Use   • Vaping Use: Never used       Breana Garza  reports that she has been smoking cigarettes. She has a 7.00 pack-year smoking history. She has never used smokeless tobacco.. I have educated her on the risk of diseases from using tobacco products such as cancer, COPD, heart disease and gum disease.     I advised her to quit and she is not willing to quit.    I spent 1 minutes counseling the patient.               LMP 07/28/2022 (Approximate)   Breastfeeding No     PHYSICAL EXAM  Physical Exam   Constitutional: She is oriented to person, place, and time. She appears well-developed and well-nourished. She does not have a sickly appearance. She does not appear ill. No distress.   HENT:   Head: Normocephalic and atraumatic.   Mouth/Throat: Mouth/Lips are normal.Uvula is midline, oropharynx is clear and moist and mucous membranes are normal. Abnormal dentition. Dental abscesses and dental caries present.       Eyes: EOM are normal.   Neck: Neck normal  appearance.  Pulmonary/Chest: Effort normal.  No respiratory distress.  Neurological: She is alert and oriented to person, place, and time.   Skin: Skin is dry. Rash (papular rash on her right arm from her hand to just above the ebow with some excoriations. ) noted.   Psychiatric: She has a normal mood and affect.       No results found for this or any previous visit.    Diagnoses and all orders for this visit:    1. Dental abscess (Primary)    2. Rash in adult    Other orders  -     clindamycin (Cleocin) 300 MG capsule; Take 1 capsule by mouth 3 (Three) Times a Day for 7 days.  Dispense: 21 capsule; Refill: 0  -     methylPREDNISolone (MEDROL) 4 MG dose pack; Take as directed on package instructions. Start in am  Dispense: 21 tablet; Refill: 0  -     triamcinolone (KENALOG) 0.1 % ointment; Apply 1 application topically to the appropriate area as directed 2 (Two) Times a Day.  Dispense: 30 g; Refill: 0        The use of a video visit has been reviewed with the patient and verbal informd consent has een obtained. Myself and Breana Garza participated in this visit. The patient is located in [address] [place] [address]. I am located in [place], [place]. Mychart and Zoom were utilized.     Note Disclaimer: At [facility], we believe that sharing information builds trust and better   relationships. You are receiving this note because you recently visited [facility]. It is possible you   will see health information before a provider has talked with you about it. This kind of information can   be easy to misunderstand. To help you fully understand what it means for your health, we urge you to   discuss this note with your provider.    Zina Copeland, DONG  08/08/2022  19:55 EDT

## 2022-08-09 NOTE — PATIENT INSTRUCTIONS
If symptoms do not improve in 3-5 days follow up with your primary care provider or urgent care  Warm or cool compresses on rash for comfort.   Wash hands before and after applying steroid ointment       Rash, Adult    A rash is a change in the color of your skin. A rash can also change the way your skin feels. There are many different conditions and factors that can cause a rash.  Follow these instructions at home:  The goal of treatment is to stop the itching and keep the rash from spreading. Watch for any changes in your symptoms. Let your doctor know about them. Follow these instructions to help with your condition:  Medicine    Take or apply over-the-counter and prescription medicines only as told by your doctor. These may include medicines:  To treat red or swollen skin (corticosteroid creams).  To treat itching.  To treat an allergy (oral antihistamines).  To treat very bad symptoms (oral corticosteroids).     Skin care  Put cool cloths (compresses) on the affected areas.  Do not scratch or rub your skin.  Avoid covering the rash. Make sure that the rash is exposed to air as much as possible.  Managing itching and discomfort  Avoid hot showers or baths. These can make itching worse. A cold shower may help.  Try taking a bath with:  Epsom salts. You can get these at your local pharmacy or grocery store. Follow the instructions on the package.  Baking soda. Pour a small amount into the bath as told by your doctor.  Colloidal oatmeal. You can get this at your local pharmacy or grocery store. Follow the instructions on the package.  Try putting baking soda paste onto your skin. Stir water into baking soda until it gets like a paste.  Try putting on a lotion that relieves itchiness (calamine lotion).  Keep cool and out of the sun. Sweating and being hot can make itching worse.  General instructions    Rest as needed.  Drink enough fluid to keep your pee (urine) pale yellow.  Wear loose-fitting clothing.  Avoid  "scented soaps, detergents, and perfumes. Use gentle soaps, detergents, perfumes, and other cosmetic products.  Avoid anything that causes your rash. Keep a journal to help track what causes your rash. Write down:  What you eat.  What cosmetic products you use.  What you drink.  What you wear. This includes jewelry.  Keep all follow-up visits as told by your doctor. This is important.     Contact a doctor if:  You sweat at night.  You lose weight.  You pee (urinate) more than normal.  You pee less than normal, or you notice that your pee is a darker color than normal.  You feel weak.  You throw up (vomit).  Your skin or the whites of your eyes look yellow (jaundice).  Your skin:  Tingles.  Is numb.  Your rash:  Does not go away after a few days.  Gets worse.  You are:  More thirsty than normal.  More tired than normal.  You have:  New symptoms.  Pain in your belly (abdomen).  A fever.  Watery poop (diarrhea).  Get help right away if:  You have a fever and your symptoms suddenly get worse.  You start to feel mixed up (confused).  You have a very bad headache or a stiff neck.  You have very bad joint pains or stiffness.  You have jerky movements that you cannot control (seizure).  Your rash covers all or most of your body. The rash may or may not be painful.  You have blisters that:  Are on top of the rash.  Grow larger.  Grow together.  Are painful.  Are inside your nose or mouth.  You have a rash that:  Looks like purple pinprick-sized spots all over your body.  Has a \"bull's eye\" or looks like a target.  Is red and painful, causes your skin to peel, and is not from being in the sun too long.  Summary  A rash is a change in the color of your skin. A rash can also change the way your skin feels.  The goal of treatment is to stop the itching and keep the rash from spreading.  Take or apply over-the-counter and prescription medicines only as told by your doctor.  Contact a doctor if you have new symptoms or symptoms " that get worse.  Keep all follow-up visits as told by your doctor. This is important.  This information is not intended to replace advice given to you by your health care provider. Make sure you discuss any questions you have with your health care provider.  Document Revised: 04/10/2020 Document Reviewed: 07/22/2019  [employer] Patient Education © 2021 [employer]       Dental Abscess    A dental abscess is an area of pus in or around a tooth. It comes from an infection. It can cause pain and other symptoms. Treatment will help with symptoms and prevent the infection from spreading.  Follow these instructions at home:  Medicines  Take over-the-counter and prescription medicines only as told by your dentist.  If you were prescribed an antibiotic medicine, take it as told by your dentist. Do not stop taking it even if you start to feel better.  If you were prescribed a gel that has numbing medicine in it, use it exactly as told.  Do not drive or use heavy machinery (like a ) while taking prescription pain medicine.  General instructions    Rinse out your mouth often with salt water.  To make salt water, dissolve ½-1 tsp of salt in 1 cup of warm water.  Eat a soft diet while your mouth is healing.  Drink enough fluid to keep your urine pale yellow.  Do not apply heat to the outside of your mouth.  Do not use any products that contain nicotine or tobacco. These include cigarettes and e-cigarettes. If you need help quitting, ask your doctor.  Keep all follow-up visits as told by your dentist. This is important.     Prevent an abscess  Brush your teeth every morning and every night. Use fluoride toothpaste.  Floss your teeth each day.  Get dental cleanings as often as told by your dentist.  Think about getting dental sealant put on teeth that have deep holes (decay).  Drink water that has fluoride in it.  Most tap water has fluoride.  Check the label on bottled water to see if it has fluoride in it.  Drink water  instead of sugary drinks.  Eat healthy meals and snacks.  Wear a mouth guard or face shield when you play sports.  Contact a doctor if:  Your pain is worse, and medicine does not help.  Get help right away if:  You have a fever or chills.  Your symptoms suddenly get worse.  You have a very bad headache.  You have problems breathing or swallowing.  You have trouble opening your mouth.  You have swelling in your neck or close to your eye.  Summary  A dental abscess is an area of pus in or around a tooth. It is caused by an infection.  Treatment will help with symptoms and prevent the infection from spreading.  Take over-the-counter and prescription medicines only as told by your dentist.  To prevent an abscess, take good care of your teeth. Brush your teeth every morning and night. Use floss every day.  Get dental cleanings as often as told by your dentist.  This information is not intended to replace advice given to you by your health care provider. Make sure you discuss any questions you have with your health care provider.  Document Revised: 07/09/2021 Document Reviewed: 07/09/2021  [employer] Patient Education © 2021 [employer]

## 2023-02-11 ENCOUNTER — TELEMEDICINE (OUTPATIENT)
Dept: FAMILY MEDICINE CLINIC | Facility: TELEHEALTH | Age: 33
End: 2023-02-11
Payer: COMMERCIAL

## 2023-02-11 DIAGNOSIS — K02.9 DENTAL CARIES: Primary | ICD-10-CM

## 2023-02-11 DIAGNOSIS — K04.7 DENTAL ABSCESS: ICD-10-CM

## 2023-02-11 PROCEDURE — 99213 OFFICE O/P EST LOW 20 MIN: CPT | Performed by: NURSE PRACTITIONER

## 2023-02-11 RX ORDER — IBUPROFEN 600 MG/1
600 TABLET ORAL EVERY 6 HOURS PRN
Qty: 16 TABLET | Refills: 0 | Status: SHIPPED | OUTPATIENT
Start: 2023-02-11 | End: 2023-02-15

## 2023-02-11 RX ORDER — AMOXICILLIN 500 MG/1
500 CAPSULE ORAL 3 TIMES DAILY
Qty: 21 CAPSULE | Refills: 0 | Status: SHIPPED | OUTPATIENT
Start: 2023-02-11 | End: 2023-02-18

## 2023-02-12 NOTE — PROGRESS NOTES
You have chosen to receive care through a telehealth visit.  Do you consent to use a video/audio connection for your medical care today? Yes     CHIEF COMPLAINT  No chief complaint on file.        HPI  Breana Coleman is a 32 y.o. female  presents with complaint of infected tooth. Reports the gum swollen. Reports she is having pain in her chin radiating to her right ear. . Mild right facial swelling. No fever or chills. No nausea or vomiting. No CP or SOA. Reports she has taken tylenol that has not helped.     Review of Systems   Constitutional: Negative for chills, fatigue and fever.   HENT: Positive for dental problem. Negative for congestion, ear discharge, ear pain, sinus pressure, sinus pain and sore throat.         Alittle right facial swelling   Respiratory: Negative for cough, chest tightness, shortness of breath and wheezing.    Cardiovascular: Negative for chest pain.   Gastrointestinal: Negative for abdominal pain, diarrhea, nausea and vomiting.   Musculoskeletal: Negative for back pain and myalgias.   Neurological: Negative for dizziness and headaches.   Psychiatric/Behavioral: Negative.        Past Medical History:   Diagnosis Date   • Chlamydia    • Gestational hypertension    • Hernia, umbilical    • Staph infection    • Urinary tract infection        No family history on file.    Social History     Socioeconomic History   • Marital status:    Tobacco Use   • Smoking status: Every Day     Packs/day: 1.00     Years: 7.00     Pack years: 7.00     Types: Cigarettes   • Smokeless tobacco: Never   • Tobacco comments:     See chart notes   Vaping Use   • Vaping Use: Never used   Substance and Sexual Activity   • Alcohol use: Not Currently   • Drug use: Never   • Sexual activity: Defer       Breana Coleman  reports that she has been smoking cigarettes. She has a 7.00 pack-year smoking history. She has never used smokeless tobacco.. I have educated her on the risk of diseases from using tobacco products  such as cancer, COPD and heart disease.     I advised her to quit and she is not willing to quit.    I spent 1 minutes counseling the patient.              LMP 02/03/2023   Breastfeeding No     PHYSICAL EXAM  Physical Exam   Constitutional: She is oriented to person, place, and time. She appears well-developed and well-nourished. No distress.   HENT:   Head: Normocephalic and atraumatic.   Right Ear: Hearing normal. No drainage.   Left Ear: Hearing normal. No drainage.   Nose: No congestion.   Mouth/Throat: Oropharynx is clear and moist. Dental abscesses and dental caries present.       Eyes: Conjunctivae and lids are normal.   Pulmonary/Chest: Effort normal.  No respiratory distress.  Lymphadenopathy:        Right cervical: No anterior cervical adenopathy present.       Left cervical: No anterior cervical adenopathy present.   Neurological: She is alert and oriented to person, place, and time.   Psychiatric: She has a normal mood and affect. Her speech is normal and behavior is normal.       Results for orders placed or performed during the hospital encounter of 05/16/22   COVID-19, ABBOTT IN-HOUSE,NASAL Swab (NO TRANSPORT MEDIA) 2 HR TAT - Swab, Nasopharynx    Specimen: Nasopharynx; Swab   Result Value Ref Range    COVID19 Presumptive Negative Presumptive Negative - Ref. Range   Urine Drug Screen - Urine, Clean Catch    Specimen: Urine, Clean Catch   Result Value Ref Range    THC, Screen, Urine Negative Negative    Phencyclidine (PCP), Urine Negative Negative    Cocaine Screen, Urine Positive (A) Negative    Methamphetamine, Ur Negative Negative    Opiate Screen Negative Negative    Amphetamine Screen, Urine Negative Negative    Benzodiazepine Screen, Urine Negative Negative    Tricyclic Antidepressants Screen Negative Negative    Methadone Screen, Urine Negative Negative    Barbiturates Screen, Urine Negative Negative    Oxycodone Screen, Urine Negative Negative    Propoxyphene Screen Negative Negative     Buprenorphine, Screen, Urine Negative Negative   Preeclampsia Panel    Specimen: Blood   Result Value Ref Range    Alkaline Phosphatase 338 (H) 39 - 117 U/L    ALT (SGPT) 8 1 - 33 U/L    AST (SGOT) 16 1 - 32 U/L    Creatinine 0.64 0.57 - 1.00 mg/dL    Total Bilirubin 0.3 0.0 - 1.2 mg/dL     135 - 214 U/L    Uric Acid 4.5 2.4 - 5.7 mg/dL   Cocaine, Urine, Confirmation - Urine, Clean Catch    Specimen: Urine, Clean Catch   Result Value Ref Range    Reference Lab Report See attached report    RPR    Specimen: Blood   Result Value Ref Range    RPR Non-Reactive Non-Reactive   Rubella Antibody, IgG    Specimen: Blood   Result Value Ref Range    Rubella Antibodies, IgG 4.43 Immune >0.99 index   Hepatitis B Surface Antigen    Specimen: Blood   Result Value Ref Range    Hepatitis B Surface Ag Non-Reactive Non-Reactive   Hepatitis C Antibody    Specimen: Blood   Result Value Ref Range    Hepatitis C Ab Reactive (A) Non-Reactive   CBC Auto Differential    Specimen: Blood   Result Value Ref Range    WBC 16.91 (H) 3.40 - 10.80 10*3/mm3    RBC 3.93 3.77 - 5.28 10*6/mm3    Hemoglobin 11.5 (L) 12.0 - 15.9 g/dL    Hematocrit 34.4 34.0 - 46.6 %    MCV 87.5 79.0 - 97.0 fL    MCH 29.3 26.6 - 33.0 pg    MCHC 33.4 31.5 - 35.7 g/dL    RDW 14.4 12.3 - 15.4 %    RDW-SD 46.1 37.0 - 54.0 fl    MPV 10.3 6.0 - 12.0 fL    Platelets 360 140 - 450 10*3/mm3    Neutrophil % 80.4 (H) 42.7 - 76.0 %    Lymphocyte % 11.1 (L) 19.6 - 45.3 %    Monocyte % 7.6 5.0 - 12.0 %    Eosinophil % 0.4 0.3 - 6.2 %    Basophil % 0.1 0.0 - 1.5 %    Immature Grans % 0.4 0.0 - 0.5 %    Neutrophils, Absolute 13.59 (H) 1.70 - 7.00 10*3/mm3    Lymphocytes, Absolute 1.88 0.70 - 3.10 10*3/mm3    Monocytes, Absolute 1.29 (H) 0.10 - 0.90 10*3/mm3    Eosinophils, Absolute 0.06 0.00 - 0.40 10*3/mm3    Basophils, Absolute 0.02 0.00 - 0.20 10*3/mm3    Immature Grans, Absolute 0.07 (H) 0.00 - 0.05 10*3/mm3    nRBC 0.0 0.0 - 0.2 /100 WBC   HIV-1 / O / 2 Ag / Antibody 4th  Generation    Specimen: Blood   Result Value Ref Range    HIV-1/ HIV-2 Non-Reactive Non-Reactive   Blood Gas, Venous, Cord    Specimen: Umbilical Cord; Cord Blood Venous   Result Value Ref Range    Site Umbilical     pH, Cord Venous 7.259 (L) 7.310 - 7.370 pH Units    pCO2, Cord Venous 49.2 (H) 28.0 - 40.0 mm Hg    pO2, Cord Venous 22.4 21.0 - 31.0 mm Hg    HCO3, Cord Venous 22.0 (H) 18.6 - 21.4 mmol/L    Base Excess, Cord Venous -5.5 (L) 0.0 - 2.0 mmol/L    O2 Sat, Cord Venous 45.4 %    Hemoglobin, Blood Gas 16.6 14 - 18 g/dL    CO2 Content 23.5 22 - 33 mmol/L    Temperature 37.0 C    Barometric Pressure for Blood Gas      Modality Room Air     FIO2 21 %    Ventilator Mode       Rate 0 Breaths/minute    PIP 0 cmH2O    IPAP 0     EPAP 0     O2 Saturation Calculated      Note      Collection Time     Blood Gas, Arterial, Cord    Specimen: Umbilical Cord; Cord Blood Arterial   Result Value Ref Range    Site Umbilical     pH, Cord Arterial 7.17 (C) 7.22 - 7.30 pH Units    pCO2, Cord Arterial 65.8 (H) 43.3 - 54.9 mmHg    pO2, Cord Arterial 8.7 (L) 11.5 - 43.3 mmHg    HCO3, Cord Arterial 23.9 (H) 16.9 - 20.5 mmol/L    Base Exc, Cord Arterial -6.3 (L) 0.0 - 2.0 mmol/L    O2 Sat, Cord Arterial 7.7 %    Hemoglobin, Blood Gas 16.7 14 - 18 g/dL    CO2 Content 25.9 22 - 33 mmol/L    Temperature 37.0 C    Barometric Pressure for Blood Gas      Modality Room Air     FIO2 21 %    Rate 0 Breaths/minute    PIP 0 cmH2O    IPAP 0     EPAP 0     Note      Notified Deangelo Delgadillo RN     Notified By [id number]     Notified Time 05/17/2022 23:58    Type & Screen    Specimen: Blood   Result Value Ref Range    ABO Type A     RH type Positive     Antibody Screen Negative     T&S Expiration Date 5/20/2022 11:59:59 PM    Tissue Pathology Exam    Specimen: Placenta; Tissue   Result Value Ref Range    Case Report       Surgical Pathology Report                         Case: [id number]                                  Authorizing Provider:  High,  Isaias ALEXANDER MD         Collected:           05/17/2022 11:30 PM          Ordering Location:     [facility]   Received:            05/18/2022 08:12 AM                                 LABOR DELIVERY                                                               Pathologist:           Sandoval Castle MD                                                            Specimen:    Placenta                                                                                   Clinical Information       35-week gestation, no prenatal care, hep C, drug use      Final Diagnosis       PLACENTA, VAGINAL DELIVERY AT 35 WEEKS GESTATION:   692 grams (approximately 411 grams expected; greater than 97th percentile).   Three vessel umbilical cord with acute funisitis.   Membranes with focal acute chorioamnionitis.   Mature third trimester placenta.  GJK      Gross Description       1. Placenta.  Received in formalin labeled placenta is an extensively disrupted dixon placenta with attached tan, thickened membranes which appear to have a marginal insertion.  Site of rupture cannot be determined.  The attached three-vessel umbilical cord is 9.5 cm in length by 1.2 cm in average diameter and is tan-white to bluegray with appropriate spiraling.  No areas of torsion, hematomas or true knots are present.  The cord inserts eccentrically, 3 cm from the disc edge.  The placental disc is 19.5 x 17 x 5 cm and 692 g trimmed of membranes and cord.  The fetal surface is bluegray and glistening.  The maternal surface is extensively torn and completeness cannot be determined.  Sectioning reveals red spongy parenchyma without masses or infarcts.  Representative sections are submitted in 5 blocks as follows: 1 A- membrane roll and proximal cord; 1B-distal cord and peripheral disc; 0P-3V-byxzlu disc.  HM        Microscopic Description       The slides are reviewed and demonstrate histopathologic features supporting the above rendered diagnosis.            Diagnoses and all orders for this visit:    1. Dental caries (Primary)    2. Dental abscess    Other orders  -     amoxicillin (AMOXIL) 500 MG capsule; Take 1 capsule by mouth 3 (Three) Times a Day for 7 days.  Dispense: 21 capsule; Refill: 0  -     ibuprofen (ADVIL,MOTRIN) 600 MG tablet; Take 1 tablet by mouth Every 6 (Six) Hours As Needed for Moderate Pain or Fever for up to 4 days.  Dispense: 16 tablet; Refill: 0    call dentist on 2-13-23  Patient reports she has a family doctor but doesn't have her name- declines referral  ER for any worsening symptoms such as high fever, drooling, facial swelling chest pain or SOA      FOLLOW-UP  As discussed during visit with PCP/[facility] if no improvement or Urgent Care/Emergency Department if worsening of symptoms    Patient verbalizes understanding of medication dosage, comfort measures, instructions for treatment and follow-up.    Nadeen Dolan, APRN  02/11/2023  22:32 EST    The use of a video visit has been reviewed with the patient and verbal informed consent has been obtained. Myself and Breana Coleman participated in this visit. The patient is located in [address] [place] [address] [address] [facility].    I am located in [place], [place]. [employer] and [employer] were utilized. I spent 5 minutes in the patient's chart for this visit.

## 2023-02-15 ENCOUNTER — TELEMEDICINE (OUTPATIENT)
Dept: FAMILY MEDICINE CLINIC | Facility: TELEHEALTH | Age: 33
End: 2023-02-15
Payer: COMMERCIAL

## 2023-02-15 DIAGNOSIS — U07.1 COVID: Primary | ICD-10-CM

## 2023-02-15 PROCEDURE — 99213 OFFICE O/P EST LOW 20 MIN: CPT | Performed by: NURSE PRACTITIONER

## 2023-02-15 RX ORDER — METHYLPREDNISOLONE 4 MG/1
TABLET ORAL
Qty: 21 TABLET | Refills: 0 | Status: SHIPPED | OUTPATIENT
Start: 2023-02-15

## 2023-02-15 RX ORDER — DEXTROMETHORPHAN HYDROBROMIDE AND PROMETHAZINE HYDROCHLORIDE 15; 6.25 MG/5ML; MG/5ML
5 SYRUP ORAL NIGHTLY PRN
Qty: 100 ML | Refills: 0 | Status: SHIPPED | OUTPATIENT
Start: 2023-02-15

## 2023-02-15 RX ORDER — FLUTICASONE PROPIONATE 50 MCG
2 SPRAY, SUSPENSION (ML) NASAL DAILY
Qty: 18.2 ML | Refills: 0 | Status: SHIPPED | OUTPATIENT
Start: 2023-02-15

## 2023-02-15 RX ORDER — ALBUTEROL SULFATE 90 UG/1
2 AEROSOL, METERED RESPIRATORY (INHALATION) EVERY 4 HOURS PRN
Qty: 18 G | Refills: 0 | Status: SHIPPED | OUTPATIENT
Start: 2023-02-15

## 2023-02-15 NOTE — PROGRESS NOTES
You have chosen to receive care through a telehealth visit.  Do you consent to use a video/audio connection for your medical care today? Yes     CHIEF COMPLAINT  Chief Complaint   Patient presents with   • COVID positive         HPI  Breana Coleman is a 32 y.o. female  presents with complaint of positive home COVID test.  Symptoms started 3 days ago.  She is having fatigue, nasal congestion, cough.    Review of Systems   Constitutional: Positive for fatigue.   HENT: Positive for congestion and postnasal drip.    Respiratory: Positive for cough.    All other systems reviewed and are negative.      Past Medical History:   Diagnosis Date   • Chlamydia    • Gestational hypertension    • Hernia, umbilical    • Staph infection    • Urinary tract infection        History reviewed. No pertinent family history.    Social History     Socioeconomic History   • Marital status:    Tobacco Use   • Smoking status: Every Day     Packs/day: 1.00     Years: 7.00     Pack years: 7.00     Types: Cigarettes   • Smokeless tobacco: Never   • Tobacco comments:     See chart notes   Vaping Use   • Vaping Use: Never used   Substance and Sexual Activity   • Alcohol use: Not Currently   • Drug use: Never   • Sexual activity: Defer       Breana Coleman  reports that she has been smoking cigarettes. She has a 7.00 pack-year smoking history. She has never used smokeless tobacco.. I have educated her on the risk of diseases from using tobacco products such as cancer, COPD and heart disease.     I advised her to quit and she is not willing to quit.    I spent 3  minutes counseling the patient.              LMP 02/03/2023     PHYSICAL EXAM  Physical Exam   Constitutional: She appears well-developed and well-nourished.   HENT:   Head: Normocephalic.   Eyes: Pupils are equal, round, and reactive to light.   Pulmonary/Chest: Effort normal.   Musculoskeletal: Normal range of motion.   Neurological: She is alert.   Psychiatric: She has a normal mood  and affect.       Results for orders placed or performed during the hospital encounter of 05/16/22   COVID-19, [facility]BBOTT IN-HOUSE,NASAL Swab (NO TRANSPORT MEDIA) 2 HR TAT - Swab, Nasopharynx    Specimen: Nasopharynx; Swab   Result Value Ref Range    COVID19 Presumptive Negative Presumptive Negative - Ref. Range   Urine Drug Screen - Urine, Clean Catch    Specimen: Urine, Clean Catch   Result Value Ref Range    THC, Screen, Urine Negative Negative    Phencyclidine (PCP), Urine Negative Negative    Cocaine Screen, Urine Positive (A) Negative    Methamphetamine, Ur Negative Negative    Opiate Screen Negative Negative    Amphetamine Screen, Urine Negative Negative    Benzodiazepine Screen, Urine Negative Negative    Tricyclic Antidepressants Screen Negative Negative    Methadone Screen, Urine Negative Negative    Barbiturates Screen, Urine Negative Negative    Oxycodone Screen, Urine Negative Negative    Propoxyphene Screen Negative Negative    Buprenorphine, Screen, Urine Negative Negative   Preeclampsia Panel    Specimen: Blood   Result Value Ref Range    Alkaline Phosphatase 338 (H) 39 - 117 U/L    ALT (SGPT) 8 1 - 33 U/L    AST (SGOT) 16 1 - 32 U/L    Creatinine 0.64 0.57 - 1.00 mg/dL    Total Bilirubin 0.3 0.0 - 1.2 mg/dL     135 - 214 U/L    Uric Acid 4.5 2.4 - 5.7 mg/dL   Cocaine, Urine, Confirmation - Urine, Clean Catch    Specimen: Urine, Clean Catch   Result Value Ref Range    Reference Lab Report See attached report    RPR    Specimen: Blood   Result Value Ref Range    RPR Non-Reactive Non-Reactive   Rubella Antibody, IgG    Specimen: Blood   Result Value Ref Range    Rubella Antibodies, IgG 4.43 Immune >0.99 index   Hepatitis B Surface Antigen    Specimen: Blood   Result Value Ref Range    Hepatitis B Surface Ag Non-Reactive Non-Reactive   Hepatitis C Antibody    Specimen: Blood   Result Value Ref Range    Hepatitis C Ab Reactive (A) Non-Reactive   CBC Auto Differential    Specimen: Blood   Result Value  Ref Range    WBC 16.91 (H) 3.40 - 10.80 10*3/mm3    RBC 3.93 3.77 - 5.28 10*6/mm3    Hemoglobin 11.5 (L) 12.0 - 15.9 g/dL    Hematocrit 34.4 34.0 - 46.6 %    MCV 87.5 79.0 - 97.0 fL    MCH 29.3 26.6 - 33.0 pg    MCHC 33.4 31.5 - 35.7 g/dL    RDW 14.4 12.3 - 15.4 %    RDW-SD 46.1 37.0 - 54.0 fl    MPV 10.3 6.0 - 12.0 fL    Platelets 360 140 - 450 10*3/mm3    Neutrophil % 80.4 (H) 42.7 - 76.0 %    Lymphocyte % 11.1 (L) 19.6 - 45.3 %    Monocyte % 7.6 5.0 - 12.0 %    Eosinophil % 0.4 0.3 - 6.2 %    Basophil % 0.1 0.0 - 1.5 %    Immature Grans % 0.4 0.0 - 0.5 %    Neutrophils, Absolute 13.59 (H) 1.70 - 7.00 10*3/mm3    Lymphocytes, Absolute 1.88 0.70 - 3.10 10*3/mm3    Monocytes, Absolute 1.29 (H) 0.10 - 0.90 10*3/mm3    Eosinophils, Absolute 0.06 0.00 - 0.40 10*3/mm3    Basophils, Absolute 0.02 0.00 - 0.20 10*3/mm3    Immature Grans, Absolute 0.07 (H) 0.00 - 0.05 10*3/mm3    nRBC 0.0 0.0 - 0.2 /100 WBC   HIV-1 / O / 2 Ag / Antibody 4th Generation    Specimen: Blood   Result Value Ref Range    HIV-1/ HIV-2 Non-Reactive Non-Reactive   Blood Gas, Venous, Cord    Specimen: Umbilical Cord; Cord Blood Venous   Result Value Ref Range    Site Umbilical     pH, Cord Venous 7.259 (L) 7.310 - 7.370 pH Units    pCO2, Cord Venous 49.2 (H) 28.0 - 40.0 mm Hg    pO2, Cord Venous 22.4 21.0 - 31.0 mm Hg    HCO3, Cord Venous 22.0 (H) 18.6 - 21.4 mmol/L    Base Excess, Cord Venous -5.5 (L) 0.0 - 2.0 mmol/L    O2 Sat, Cord Venous 45.4 %    Hemoglobin, Blood Gas 16.6 14 - 18 g/dL    CO2 Content 23.5 22 - 33 mmol/L    Temperature 37.0 C    Barometric Pressure for Blood Gas      Modality Room Air     FIO2 21 %    Ventilator Mode       Rate 0 Breaths/minute    PIP 0 cmH2O    IPAP 0     EPAP 0     O2 Saturation Calculated      Note      Collection Time     Blood Gas, Arterial, Cord    Specimen: Umbilical Cord; Cord Blood Arterial   Result Value Ref Range    Site Umbilical     pH, Cord Arterial 7.17 (C) 7.22 - 7.30 pH Units    pCO2, Cord  Arterial 65.8 (H) 43.3 - 54.9 mmHg    pO2, Cord Arterial 8.7 (L) 11.5 - 43.3 mmHg    HCO3, Cord Arterial 23.9 (H) 16.9 - 20.5 mmol/L    Base Exc, Cord Arterial -6.3 (L) 0.0 - 2.0 mmol/L    O2 Sat, Cord Arterial 7.7 %    Hemoglobin, Blood Gas 16.7 14 - 18 g/dL    CO2 Content 25.9 22 - 33 mmol/L    Temperature 37.0 C    Barometric Pressure for Blood Gas      Modality Room Air     FIO2 21 %    Rate 0 Breaths/minute    PIP 0 cmH2O    IPAP 0     EPAP 0     Note      Notified Deangelo Delgadillo RN     Notified By [id number]     Notified Time 05/17/2022 23:58    Type & Screen    Specimen: Blood   Result Value Ref Range    ABO Type A     RH type Positive     Antibody Screen Negative     T&S Expiration Date 5/20/2022 11:59:59 PM    Tissue Pathology Exam    Specimen: Placenta; Tissue   Result Value Ref Range    Case Report       Surgical Pathology Report                         Case: [id number]                                  Authorizing Provider:  Isaias Barclay MD         Collected:           05/17/2022 11:30 PM          Ordering Location:     [facility]   Received:            05/18/2022 08:12 AM                                 LABOR DELIVERY                                                               Pathologist:           Sandoval Castle MD                                                            Specimen:    Placenta                                                                                   Clinical Information       35-week gestation, no prenatal care, hep C, drug use      Final Diagnosis       PLACENTA, VAGINAL DELIVERY AT 35 WEEKS GESTATION:   692 grams (approximately 411 grams expected; greater than 97th percentile).   Three vessel umbilical cord with acute funisitis.   Membranes with focal acute chorioamnionitis.   Mature third trimester placenta.  GJK      Gross Description       1. Placenta.  Received in formalin labeled placenta is an extensively disrupted dixon placenta with attached tan,  thickened membranes which appear to have a marginal insertion.  Site of rupture cannot be determined.  The attached three-vessel umbilical cord is 9.5 cm in length by 1.2 cm in average diameter and is tan-white to bluegray with appropriate spiraling.  No areas of torsion, hematomas or true knots are present.  The cord inserts eccentrically, 3 cm from the disc edge.  The placental disc is 19.5 x 17 x 5 cm and 692 g trimmed of membranes and cord.  The fetal surface is bluegray and glistening.  The maternal surface is extensively torn and completeness cannot be determined.  Sectioning reveals red spongy parenchyma without masses or infarcts.  Representative sections are submitted in 5 blocks as follows: 1 A- membrane roll and proximal cord; 1B-distal cord and peripheral disc; 2M-0J-oycbmg disc.  HM        Microscopic Description       The slides are reviewed and demonstrate histopathologic features supporting the above rendered diagnosis.           Diagnoses and all orders for this visit:    1. COVID (Primary)  -     methylPREDNISolone (MEDROL) 4 MG dose pack; Take as directed on package instructions.  Dispense: 21 tablet; Refill: 0  -     albuterol sulfate  (90 Base) MCG/ACT inhaler; Inhale 2 puffs Every 4 (Four) Hours As Needed for Wheezing.  Dispense: 18 g; Refill: 0  -     promethazine-dextromethorphan (PROMETHAZINE-DM) 6.25-15 MG/5ML syrup; Take 5 mL by mouth At Night As Needed for Cough.  Dispense: 100 mL; Refill: 0  -     fluticasone (FLONASE) 50 MCG/ACT nasal spray; 2 sprays into the nostril(s) as directed by provider Daily.  Dispense: 18.2 mL; Refill: 0          FOLLOW-UP  As discussed during visit with PCP/[facility] Care if no improvement or Urgent Care/Emergency Department if worsening of symptoms    Patient verbalizes understanding of medication dosage, comfort measures, instructions for treatment and follow-up.    Dacia Echols, DONG  02/15/2023  03:00 EST    The use of a video visit has been  reviewed with the patient and verbal informed consent has been obtained. Myself and Breana Coleman participated in this visit. The patient is located in [address] [place] [address] [address] [facility].    I am located in [place], [place]. [employer] and [employer]ation Document Preparation were utilized. I spent 20 minutes in the patient's chart for this visit.

## 2023-04-14 ENCOUNTER — TELEMEDICINE (OUTPATIENT)
Dept: FAMILY MEDICINE CLINIC | Facility: TELEHEALTH | Age: 33
End: 2023-04-14
Payer: COMMERCIAL

## 2023-04-14 DIAGNOSIS — K08.89 PAIN, DENTAL: ICD-10-CM

## 2023-04-14 DIAGNOSIS — S21.91XA: ICD-10-CM

## 2023-04-14 DIAGNOSIS — H00.012 HORDEOLUM EXTERNUM OF RIGHT LOWER EYELID: Primary | ICD-10-CM

## 2023-04-14 RX ORDER — AMOXICILLIN AND CLAVULANATE POTASSIUM 875; 125 MG/1; MG/1
1 TABLET, FILM COATED ORAL 2 TIMES DAILY
Qty: 20 TABLET | Refills: 0 | Status: SHIPPED | OUTPATIENT
Start: 2023-04-14 | End: 2023-04-24

## 2023-04-14 RX ORDER — ERYTHROMYCIN 5 MG/G
OINTMENT OPHTHALMIC NIGHTLY
Qty: 3.5 G | Refills: 0 | Status: SHIPPED | OUTPATIENT
Start: 2023-04-14 | End: 2023-04-21

## 2023-04-14 NOTE — PATIENT INSTRUCTIONS
Clean cut on chest with warm soapy water and can continue to apply antibiotic ointment.  If it does not improve in 3-5 days or you develop redness around wound or red streaks from wound, drainage other than clear drainage, pain and/or fever, go to the urgent care or the emergency department for treatment.     Warm compresses to stye 3-4 times per day  Wash hands before and after touching eye and instilling eye ointment   If symptoms do not improve on 7 days or if symptoms worsen anytime follow up.     Call or get online with your insurance to get a list of dentist that accept your insurance close to you.        Clinical References    Wound Infection  A wound infection happens when tiny organisms (microorganisms) start to grow in a wound. A wound infection is most often caused by bacteria. Infection can cause the wound to break open or worsen. Wound infection needs treatment. If a wound infection is left untreated, complications can occur. Untreated wound infections may lead to an infection in the bloodstream (septicemia) or a bone infection (osteomyelitis).  What are the causes?  This condition is most often caused by bacteria growing in a wound. Other microorganisms, like yeast and fungi, can also cause wound infections.  What increases the risk?  The following factors may make you more likely to develop this condition:  Having a weak body defense system (immune system).  Having diabetes.  Taking steroid medicines for a long time (chronic use).  Smoking.  Being an older person.  Being overweight.  Taking chemotherapy medicines.  What are the signs or symptoms?  Symptoms of this condition include:  Having more redness, swelling, or pain at the wound site.  Having more blood or fluid at the wound site.  A bad smell coming from a wound or bandage (dressing).  Having a fever.  Feeling tired or fatigued.  Having warmth at or around the wound.  Having pus at the wound site.  How is this diagnosed?  This condition is  diagnosed with a medical history and physical exam. You may also have a wound culture or blood tests or both.  How is this treated?  This condition is usually treated with an antibiotic medicine.  The infection should improve 24-48 hours after you start antibiotics.  After 24-48 hours, redness around the wound should stop spreading, and the wound should be less painful.  Follow these instructions at home:  Medicines  Take or apply over-the-counter and prescription medicines only as told by your health care provider.  If you were prescribed an antibiotic medicine, take or apply it as told by your health care provider. Do not stop using the antibiotic even if you start to feel better.  Wound care  Two stitched incisions. One is normal. The other is red with pus and infected.      Clean the wound each day, or as told by your health care provider.  Wash the wound with mild soap and water.  Rinse the wound with water to remove all soap.  Pat the wound dry with a clean towel. Do not rub it.  Follow instructions from your health care provider about how to take care of your wound. Make sure you:  Wash your hands with soap and water before and after you change your dressing. If soap and water are not available, use hand .  Change your dressing as told by your health care provider.  Leave stitches (sutures), skin glue, or adhesive strips in place if your wound has been closed. These skin closures may need to stay in place for 2 weeks or longer. If adhesive strip edges start to loosen and curl up, you may trim the loose edges. Do not remove adhesive strips completely unless your health care provider tells you to do that. Some wounds are left open to heal on their own.  Check your wound every day for signs of infection. Watch for:  More redness, swelling, or pain.  More fluid or blood.  Warmth.  Pus or a bad smell.  General instructions  Keep the dressing dry until your health care provider says it can be removed.  Do  not take baths, swim, or use a hot tub until your health care provider approves. Ask your health care provider if you may take showers. You may only be allowed to take sponge baths.  Raise (elevate) the injured area above the level of your heart while you are sitting or lying down.  Do not scratch or pick at the wound.  Keep all follow-up visits as told by your health care provider. This is important.  Contact a health care provider if:  Your pain is not controlled with medicine.  You have more redness, swelling, or pain around your wound.  You have more fluid or blood coming from your wound.  Your wound feels warm to the touch.  You have pus coming from your wound.  You continue to notice a bad smell coming from your wound or your dressing.  Your wound that was closed breaks open.  Get help right away if:  You have a red streak going away from your wound.  You have a fever.  Summary  A wound infection happens when tiny organisms (microorganisms) start to grow in a wound.  This condition is usually treated with an antibiotic medicine.  Follow instructions from your health care provider about how to take care of your wound.  Contact a health care provider if your wound infection does not begin to improve in 24-48 hours, or your symptoms worsen.  Keep all follow-up visits as told by your health care provider. This is important.  This information is not intended to replace advice given to you by your health care provider. Make sure you discuss any questions you have with your health care provider.  Document Revised: 10/13/2022 Document Reviewed: 10/13/2022  [employer] Patient Education © 2022 [employer]     Stye  A stye, also known as a hordeolum, is a bump that forms on an eyelid. It may look like a pimple next to the eyelash. A stye can form inside the eyelid (internal stye) or outside the eyelid (external stye). A stye can cause redness, swelling, and pain on the eyelid.  Styes are very common. Anyone can get them  at any age. They usually occur in just one eye at a time, but you may have more than one in either eye.  What are the causes?  A stye is caused by an infection. The infection is almost always caused by bacteria called Staphylococcus aureus. This is a common type of bacteria that lives on the skin.  An internal stye may result from an infected oil-producing gland inside the eyelid. An external stye may be caused by an infection at the base of the eyelash (hair follicle).  What increases the risk?  You are more likely to develop a stye if:  You have had a stye before.  You have any of these conditions:  Red, itchy, inflamed eyelids (blepharitis).  A skin condition such as seborrheic dermatitis or rosacea.  High fat levels in your blood (lipids).  Dry eyes.  What are the signs or symptoms?  Two eyes. One eye is normal, and one eye has a stye on the lower eyelid.      The most common symptom of a stye is eyelid pain. Internal styes are more painful than external styes. Other symptoms may include:  Painful swelling of your eyelid.  A scratchy feeling in your eye.  Tearing and redness of your eye.  A pimple-like bump on the edge of the eyelid.  Pus draining from the stye.  How is this diagnosed?  Your health care provider may be able to diagnose a stye just by examining your eye. The health care provider may also check to make sure:  You do not have a fever or other signs of a more serious infection.  The infection has not spread to other parts of your eye or areas around your eye.  How is this treated?  Most styes will clear up in a few days without treatment or with warm compresses applied to the area. You may need to use antibiotic drops or ointment to treat an infection. Sometimes, steroid drops or ointment are used in addition to antibiotics.  In some cases, your health care provider may give you a small steroid injection in the eyelid.  If your stye does not heal with routine treatment, your health care provider may  drain pus from the stye using a thin blade or needle. This may be done if the stye is large, causing a lot of pain, or affecting your vision.  Follow these instructions at home:  Take over-the-counter and prescription medicines only as told by your health care provider. This includes eye drops or ointments.  If you were prescribed an antibiotic medicine, steroid medicine, or both, apply or use them as told by your health care provider. Do not stop using the medicine even if your condition improves.  Apply a warm, wet cloth (warm compress) to your eye for 5-10 minutes, 4 to 6 times a day.  Clean the affected eyelid as directed by your health care provider.  Do not wear contact lenses or eye makeup until your stye has healed and your health care provider says that it is safe.  Do not try to pop or drain the stye.  Do not rub your eye.  Contact a health care provider if:  You have chills or a fever.  Your stye does not go away after several days.  Your stye affects your vision.  Your eyeball becomes swollen, red, or painful.  Get help right away if:  You have pain when moving your eye around.  Summary  A stye is a bump that forms on an eyelid. It may look like a pimple next to the eyelash.  A stye can form inside the eyelid (internal stye) or outside the eyelid (external stye). A stye can cause redness, swelling, and pain on the eyelid.  Your health care provider may be able to diagnose a stye just by examining your eye.  Apply a warm, wet cloth (warm compress) to your eye for 5-10 minutes, 4 to 6 times a day.  This information is not intended to replace advice given to you by your health care provider. Make sure you discuss any questions you have with your health care provider.  Document Revised: 02/23/2022 Document Reviewed: 02/23/2022  Elsevier Patient Education © 2022 [employer]     Dental Abscess  Cross-sectional view of two teeth: one tooth is normal and the other tooth has an abscess.      A dental abscess is  an area of pus in or around a tooth. It comes from an infection. It can cause pain and other symptoms. Treatment will help with symptoms and prevent the infection from spreading.  What are the causes?  This condition is caused by an infection in or around the tooth. This can be from:  Very bad tooth decay (cavities).  A bad injury to the tooth, such as a broken or chipped tooth.  What increases the risk?  The risk to get an abscess is higher in males. It is also more likely in people who:  Have dental decay.  Have very bad gum disease.  Eat sugary snacks between meals.  Use tobacco.  Have diabetes.  Have a weak disease-fighting system (immune system).  Do not brush their teeth regularly.  What are the signs or symptoms?  Some mild symptoms are:  Tenderness.  Bad breath.  Fever.  A sharp, sour taste in the mouth.  Pain in and around the infected tooth.  Worse symptoms of this condition include:  Swollen neck glands.  Chills.  Pus draining around the tooth.  Swelling and redness around the tooth, the mouth, or the face.  Very bad pain in and around the tooth.  The worst symptoms can include:  Difficulty swallowing.  Difficulty opening your mouth.  Feeling like you may vomit or vomiting.  How is this treated?  This is treated by getting rid of the infection. Your dentist will discuss ways to do this, including:  Antibiotic medicines.  Antibacterial mouth rinse.  An incision in the abscess to drain out the pus.  A root canal.  Removing the tooth.  Follow these instructions at home:  Medicines  Take over-the-counter and prescription medicines only as told by your dentist.  If you were prescribed an antibiotic medicine, take it as told by your dentist. Do not stop taking it even if you start to feel better.  If you were prescribed a gel that has numbing medicine in it, use it exactly as told.  Ask your dentist if you should avoid driving or using machines while you are taking your medicine.  General instructions  Rinse  your mouth often with salt water. To make salt water, dissolve ½-1 tsp (3-6 g) of salt in 1 cup (237 mL) of warm water.  Eat a soft diet while your mouth is healing.  Drink enough fluid to keep your pee (urine) pale yellow.  Do not apply heat to the outside of your mouth.  Do not smoke or use any products that contain nicotine or tobacco. If you need help quitting, ask your dentist.  Keep all follow-up visits.  Prevent an abscess  Brush your teeth every morning and every night. Use fluoride toothpaste.  Floss your teeth each day.  Get dental cleanings as often as told by your dentist.  Think about getting dental sealant put on teeth that have deep holes (decay).  Drink water that has fluoride in it.  Most tap water has fluoride.  Check the label on bottled water to see if it has fluoride in it.  Drink water instead of sugary drinks.  Eat healthy meals and snacks.  Wear a mouth guard or face shield when you play sports.  Contact a doctor if:  Your pain is worse and medicine does not help.  Get help right away if:  You have a fever or chills.  Your symptoms suddenly get worse.  You have a very bad headache.  You have problems breathing or swallowing.  You have trouble opening your mouth.  You have swelling in your neck or close to your eye.  These symptoms may be an emergency. Get help right away. Call your local emergency services (911 in the [place]  Do not wait to see if the symptoms will go away.  Do not drive yourself to the hospital.  Summary  A dental abscess is an area of pus in or around a tooth. It is caused by an infection.  Treatment will help with symptoms and prevent the infection from spreading.  Take over-the-counter and prescription medicines only as told by your dentist.  To prevent an abscess, take good care of your teeth. Brush your teeth every morning and night. Use floss every day.  Get dental cleanings as often as told by your dentist.  This information is not intended to replace advice given to  you by your health care provider. Make sure you discuss any questions you have with your health care provider.  Document Revised: 02/24/2022 Document Reviewed: 02/24/2022  Elsevier Patient Education © 2022 Else[employer] Inc.

## 2023-04-14 NOTE — PROGRESS NOTES
CHIEF COMPLAINT  Chief Complaint   Patient presents with   • Dental Pain   • Stye         HPI  Breana Coleman is a 32 y.o. female  presents with complaint of stye om right eye for a week. She has a dental abscess and unable to find a dentist that takes her insurance. She also has a cut on her left breast that happened 2 weeks ago and is slow to heal, but no infection. 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   She is UTD on Tetanus which she had 1 year ago when pregnant with her child.     Review of Systems   Constitutional: Negative for chills, diaphoresis, fatigue and fever.   HENT: Positive for dental problem. Negative for drooling and facial swelling.    Eyes: Positive for pain. Negative for photophobia, discharge, redness, itching and visual disturbance.   Skin: Positive for wound.       Past Medical History:   Diagnosis Date   • Chlamydia    • Gestational hypertension    • Hernia, umbilical    • Staph infection    • Urinary tract infection        No family history on file.    Social History     Socioeconomic History   • Marital status:    Tobacco Use   • Smoking status: Every Day     Packs/day: 1.00     Years: 7.00     Pack years: 7.00     Types: Cigarettes     Passive exposure: Current   • Smokeless tobacco: Never   • Tobacco comments:     See chart notes   Vaping Use   • Vaping Use: Never used   Substance and Sexual Activity   • Alcohol use: Not Currently   • Drug use: Never   • Sexual activity: Defer       Breana Coleman  reports that she has been smoking cigarettes. She has a 7.00 pack-year smoking history. She has been exposed to tobacco smoke. She has never used smokeless tobacco.. I have educated her on the risk of diseases from using tobacco products such as cancer, COPD and heart disease.     I advised her to quit and she is not willing to quit.    I spent 1 minutes counseling the patient.      LMP 04/14/2023 (Exact Date)   Breastfeeding No      PHYSICAL EXAM  Physical Exam   Constitutional: She is oriented to person, place, and time. She appears well-developed and well-nourished. She does not have a sickly appearance. She does not appear ill. No distress.   HENT:   Head: Normocephalic and atraumatic.   Mouth/Throat: Mouth/Lips are normal.Uvula is midline, oropharynx is clear and moist and mucous membranes are normal. Abnormal dentition. Dental caries present. No dental abscesses.   Not sure there is clear evidence of a dental abscess, but she is having similar pain.    Eyes: Conjunctivae and EOM are normal. Right eye exhibits hordeolum. Right eye exhibits no discharge, no exudate and no edema. No foreign body present in the right eye. Left eye exhibits no discharge, no exudate, no hordeolum and no edema. No foreign body present in the left eye. Eyelid: no right ptosis or left ptosis. No scleral icterus.       Neck: Neck normal appearance.  Pulmonary/Chest: Effort normal.  No respiratory distress.  Neurological: She is alert and oriented to person, place, and time.   Skin: Skin is dry.   2 inch cut on her left chest/breast. It does appear to be healing with granulation tissue. She reports she has clear drainage from the wound at times.    Psychiatric: She has a normal mood and affect.         Diagnoses and all orders for this visit:    1. Hordeolum externum of right lower eyelid (Primary)    2. Cut of chest    3. Pain, dental    Other orders  -     amoxicillin-clavulanate (Augmentin) 875-125 MG per tablet; Take 1 tablet by mouth 2 (Two) Times a Day for 10 days.  Dispense: 20 tablet; Refill: 0  -     erythromycin (ROMYCIN) 5 MG/GM ophthalmic ointment; Administer  to the right eye Every Night for 7 days.  Dispense: 3.5 g; Refill: 0    Needs a dental appointment.     The use of a video visit has been reviewed with the patient and verbal informed consent has been obtained. Myself and Breana Coleman participated in this visit. The patient is located in [facility]  [place] [address]. I am located in [place], [place]. Mychart and Twilio were utilized.       Note Disclaimer: At [facility], we believe that sharing information builds trust and better   relationships. You are receiving this note because you recently visited [facility]. It is possible you   will see health information before a provider has talked with you about it. This kind of information can   be easy to misunderstand. To help you fully understand what it means for your health, we urge you to   discuss this note with your provider.    Zina Copeland, DONG  04/14/2023  12:16 EDT

## 2023-06-12 ENCOUNTER — TELEMEDICINE (OUTPATIENT)
Dept: FAMILY MEDICINE CLINIC | Facility: TELEHEALTH | Age: 33
End: 2023-06-12
Payer: COMMERCIAL

## 2023-06-12 DIAGNOSIS — K08.89 TOOTHACHE: ICD-10-CM

## 2023-06-12 DIAGNOSIS — K02.9 DENTAL CARIES: Primary | ICD-10-CM

## 2023-06-12 DIAGNOSIS — K04.7 DENTAL ABSCESS: ICD-10-CM

## 2023-06-12 RX ORDER — AMOXICILLIN AND CLAVULANATE POTASSIUM 875; 125 MG/1; MG/1
1 TABLET, FILM COATED ORAL 2 TIMES DAILY
Qty: 20 TABLET | Refills: 0 | Status: SHIPPED | OUTPATIENT
Start: 2023-06-12 | End: 2023-06-22

## 2023-06-12 NOTE — PATIENT INSTRUCTIONS
Get a dental appointment asap  Tylenol or motrin per bottle instructions for pain  May alternate heat and ice for pain

## 2023-06-12 NOTE — PROGRESS NOTES
Subjective   Breana Coleman is a 32 y.o. female.     History of Present Illness  She has a tooth ache left lower side and a painless bump on her right upper jaw. She has not been on antibiotics recently. Her symptoms started 2-3 days ago, she has been taking tylenol. She does not have dentist.      The following portions of the patient's history were reviewed and updated as appropriate: allergies, current medications, past family history, past medical history, past social history, past surgical history, and problem list.    Review of Systems   Constitutional:  Negative for fever.   HENT:  Positive for dental problem.    Respiratory:  Positive for cough.      Objective   Physical Exam  Constitutional:       General: She is not in acute distress.     Appearance: She is well-developed. She is not diaphoretic.   HENT:      Mouth/Throat:      Dentition: Gingival swelling, dental caries and dental abscesses present.   Pulmonary:      Effort: Pulmonary effort is normal.   Neurological:      Mental Status: She is alert and oriented to person, place, and time.   Psychiatric:         Behavior: Behavior normal.         Assessment & Plan   Diagnoses and all orders for this visit:    1. Dental caries (Primary)  -     amoxicillin-clavulanate (AUGMENTIN) 875-125 MG per tablet; Take 1 tablet by mouth 2 (Two) Times a Day for 10 days.  Dispense: 20 tablet; Refill: 0    2. Dental abscess  -     amoxicillin-clavulanate (AUGMENTIN) 875-125 MG per tablet; Take 1 tablet by mouth 2 (Two) Times a Day for 10 days.  Dispense: 20 tablet; Refill: 0    3. Toothache  -     amoxicillin-clavulanate (AUGMENTIN) 875-125 MG per tablet; Take 1 tablet by mouth 2 (Two) Times a Day for 10 days.  Dispense: 20 tablet; Refill: 0                 The use of a video visit has been reviewed with the patient and verbal informed consent has been obtained. Myself and Breana Coleman participated in this visit. The patient is located in  [place], [place] in her [facility]  car on [address] near [facility] . I am located in [place], [place]. Apartment [employer] and [employer] were utilized. I spent 10 minutes in the patient's chart for this visit.

## 2023-09-16 ENCOUNTER — TELEMEDICINE (OUTPATIENT)
Dept: FAMILY MEDICINE CLINIC | Facility: TELEHEALTH | Age: 33
End: 2023-09-16
Payer: COMMERCIAL

## 2023-09-16 DIAGNOSIS — N61.1 LEFT BREAST ABSCESS: Primary | ICD-10-CM

## 2023-09-16 RX ORDER — MUPIROCIN CALCIUM 20 MG/G
1 CREAM TOPICAL 3 TIMES DAILY
Qty: 21 G | Refills: 0 | Status: SHIPPED | OUTPATIENT
Start: 2023-09-16 | End: 2023-09-17 | Stop reason: SDUPTHER

## 2023-09-16 RX ORDER — DOXYCYCLINE HYCLATE 100 MG/1
100 CAPSULE ORAL 2 TIMES DAILY
Qty: 20 CAPSULE | Refills: 0 | Status: SHIPPED | OUTPATIENT
Start: 2023-09-16 | End: 2023-09-26

## 2023-09-17 DIAGNOSIS — N61.1 LEFT BREAST ABSCESS: ICD-10-CM

## 2023-09-17 RX ORDER — MUPIROCIN CALCIUM 20 MG/G
1 CREAM TOPICAL 3 TIMES DAILY
Qty: 21 G | Refills: 0 | Status: SHIPPED | OUTPATIENT
Start: 2023-09-17 | End: 2023-09-24

## 2023-09-17 NOTE — PROGRESS NOTES
You have chosen to receive care through a telehealth visit.  Do you consent to use a video/audio connection for your medical care today? Yes     CHIEF COMPLAINT  No chief complaint on file.        RAJAN Coleman is a 33 y.o. female  presents with complaint of abscess to left breast x 6 months.  She states that it will not heal.  She has been using Peroxide and saline.  She has a history of Staph infection.  Wound is open and is draining yellow/brown drainage.    Review of Systems   Skin:  Positive for wound (left breast).   All other systems reviewed and are negative.    Past Medical History:   Diagnosis Date    Chlamydia     Gestational hypertension     Hernia, umbilical     Staph infection     Urinary tract infection        No family history on file.    Social History     Socioeconomic History    Marital status:    Tobacco Use    Smoking status: Every Day     Packs/day: 1.00     Years: 7.00     Pack years: 7.00     Types: Cigarettes     Passive exposure: Current    Smokeless tobacco: Never    Tobacco comments:     See chart notes   Vaping Use    Vaping Use: Never used   Substance and Sexual Activity    Alcohol use: Not Currently    Drug use: Never    Sexual activity: Defer       Breana Coleman  reports that she has been smoking cigarettes. She has a 7.00 pack-year smoking history. She has been exposed to tobacco smoke. She has never used smokeless tobacco.. I have educated her on the risk of diseases from using tobacco products such as cancer, COPD, and heart disease.     I advised her to quit and she is not willing to quit.    I spent  1  minutes counseling the patient.              There were no vitals taken for this visit.    PHYSICAL EXAM  Physical Exam   Constitutional: She appears well-developed and well-nourished.   HENT:   Head: Normocephalic.   Eyes: Pupils are equal, round, and reactive to light.   Pulmonary/Chest: Effort normal.   Musculoskeletal: Normal range of motion.   Neurological: She is  alert.   Skin:   Left breast abscess at 7 o'clock with yellow/brown drainage.   Psychiatric: She has a normal mood and affect.     Results for orders placed or performed during the hospital encounter of 05/16/22   COVID-19, ABBOTT IN-HOUSE,NASAL Swab (NO TRANSPORT MEDIA) 2 HR TAT - Swab, Nasopharynx    Specimen: Nasopharynx; Swab   Result Value Ref Range    COVID19 Presumptive Negative Presumptive Negative - Ref. Range   Urine Drug Screen - Urine, Clean Catch    Specimen: Urine, Clean Catch   Result Value Ref Range    THC, Screen, Urine Negative Negative    Phencyclidine (PCP), Urine Negative Negative    Cocaine Screen, Urine Positive (A) Negative    Methamphetamine, Ur Negative Negative    Opiate Screen Negative Negative    Amphetamine Screen, Urine Negative Negative    Benzodiazepine Screen, Urine Negative Negative    Tricyclic Antidepressants Screen Negative Negative    Methadone Screen, Urine Negative Negative    Barbiturates Screen, Urine Negative Negative    Oxycodone Screen, Urine Negative Negative    Propoxyphene Screen Negative Negative    Buprenorphine, Screen, Urine Negative Negative   Preeclampsia Panel    Specimen: Blood   Result Value Ref Range    Alkaline Phosphatase 338 (H) 39 - 117 U/L    ALT (SGPT) 8 1 - 33 U/L    AST (SGOT) 16 1 - 32 U/L    Creatinine 0.64 0.57 - 1.00 mg/dL    Total Bilirubin 0.3 0.0 - 1.2 mg/dL     135 - 214 U/L    Uric Acid 4.5 2.4 - 5.7 mg/dL   Cocaine, Urine, Confirmation - Urine, Clean Catch    Specimen: Urine, Clean Catch   Result Value Ref Range    Reference Lab Report See attached report    RPR    Specimen: Blood   Result Value Ref Range    RPR Non-Reactive Non-Reactive   Rubella Antibody, IgG    Specimen: Blood   Result Value Ref Range    Rubella Antibodies, IgG 4.43 Immune >0.99 index   Hepatitis B Surface Antigen    Specimen: Blood   Result Value Ref Range    Hepatitis B Surface Ag Non-Reactive Non-Reactive   Hepatitis C Antibody    Specimen: Blood   Result Value  Ref Range    Hepatitis C Ab Reactive (A) Non-Reactive   CBC Auto Differential    Specimen: Blood   Result Value Ref Range    WBC 16.91 (H) 3.40 - 10.80 10*3/mm3    RBC 3.93 3.77 - 5.28 10*6/mm3    Hemoglobin 11.5 (L) 12.0 - 15.9 g/dL    Hematocrit 34.4 34.0 - 46.6 %    MCV 87.5 79.0 - 97.0 fL    MCH 29.3 26.6 - 33.0 pg    MCHC 33.4 31.5 - 35.7 g/dL    RDW 14.4 12.3 - 15.4 %    RDW-SD 46.1 37.0 - 54.0 fl    MPV 10.3 6.0 - 12.0 fL    Platelets 360 140 - 450 10*3/mm3    Neutrophil % 80.4 (H) 42.7 - 76.0 %    Lymphocyte % 11.1 (L) 19.6 - 45.3 %    Monocyte % 7.6 5.0 - 12.0 %    Eosinophil % 0.4 0.3 - 6.2 %    Basophil % 0.1 0.0 - 1.5 %    Immature Grans % 0.4 0.0 - 0.5 %    Neutrophils, Absolute 13.59 (H) 1.70 - 7.00 10*3/mm3    Lymphocytes, Absolute 1.88 0.70 - 3.10 10*3/mm3    Monocytes, Absolute 1.29 (H) 0.10 - 0.90 10*3/mm3    Eosinophils, Absolute 0.06 0.00 - 0.40 10*3/mm3    Basophils, Absolute 0.02 0.00 - 0.20 10*3/mm3    Immature Grans, Absolute 0.07 (H) 0.00 - 0.05 10*3/mm3    nRBC 0.0 0.0 - 0.2 /100 WBC   HIV-1 / O / 2 Ag / Antibody 4th Generation    Specimen: Blood   Result Value Ref Range    HIV-1/ HIV-2 Non-Reactive Non-Reactive   Blood Gas, Venous, Cord    Specimen: Umbilical Cord; Cord Blood Venous   Result Value Ref Range    Site Umbilical     pH, Cord Venous 7.259 (L) 7.310 - 7.370 pH Units    pCO2, Cord Venous 49.2 (H) 28.0 - 40.0 mm Hg    pO2, Cord Venous 22.4 21.0 - 31.0 mm Hg    HCO3, Cord Venous 22.0 (H) 18.6 - 21.4 mmol/L    Base Excess, Cord Venous -5.5 (L) 0.0 - 2.0 mmol/L    O2 Sat, Cord Venous 45.4 %    Hemoglobin, Blood Gas 16.6 14 - 18 g/dL    CO2 Content 23.5 22 - 33 mmol/L    Temperature 37.0 C    Barometric Pressure for Blood Gas      Modality Room Air     FIO2 21 %    Ventilator Mode       Rate 0 Breaths/minute    PIP 0 cmH2O    IPAP 0     EPAP 0     O2 Saturation Calculated      Note      Collection Time     Blood Gas, Arterial, Cord    Specimen: Umbilical Cord; Cord Blood Arterial    Result Value Ref Range    Site Umbilical     pH, Cord Arterial 7.17 (C) 7.22 - 7.30 pH Units    pCO2, Cord Arterial 65.8 (H) 43.3 - 54.9 mmHg    pO2, Cord Arterial 8.7 (L) 11.5 - 43.3 mmHg    HCO3, Cord Arterial 23.9 (H) 16.9 - 20.5 mmol/L    Base Exc, Cord Arterial -6.3 (L) 0.0 - 2.0 mmol/L    O2 Sat, Cord Arterial 7.7 %    Hemoglobin, Blood Gas 16.7 14 - 18 g/dL    CO2 Content 25.9 22 - 33 mmol/L    Temperature 37.0 C    Barometric Pressure for Blood Gas      Modality Room Air     FIO2 21 %    Rate 0 Breaths/minute    PIP 0 cmH2O    IPAP 0     EPAP 0     Note      Notified Deangelo Delgadillo RN     Notified By [id number]     Notified Time 05/17/2022 23:58    Type & Screen    Specimen: Blood   Result Value Ref Range    ABO Type A     RH type Positive     Antibody Screen Negative     T&S Expiration Date 5/20/2022 11:59:59 PM    Tissue Pathology Exam    Specimen: Placenta; Tissue   Result Value Ref Range    Case Report       Surgical Pathology Report                         Case: [id number]                                  Authorizing Provider:  Isaias Barclay MD         Collected:           05/17/2022 11:30 PM          Ordering Location:     [facility]   Received:            05/18/2022 08:12 AM                                 LABOR DELIVERY                                                               Pathologist:           Sandoval Castle MD                                                            Specimen:    Placenta                                                                                   Clinical Information       35-week gestation, no prenatal care, hep C, drug use      Final Diagnosis       PLACENTA, VAGINAL DELIVERY AT 35 WEEKS GESTATION:   692 grams (approximately 411 grams expected; greater than 97th percentile).   Three vessel umbilical cord with acute funisitis.   Membranes with focal acute chorioamnionitis.   Mature third trimester placenta.  GJK      Gross Description       1.  Placenta.  Received in formalin labeled placenta is an extensively disrupted dixon placenta with attached tan, thickened membranes which appear to have a marginal insertion.  Site of rupture cannot be determined.  The attached three-vessel umbilical cord is 9.5 cm in length by 1.2 cm in average diameter and is tan-white to bluegray with appropriate spiraling.  No areas of torsion, hematomas or true knots are present.  The cord inserts eccentrically, 3 cm from the disc edge.  The placental disc is 19.5 x 17 x 5 cm and 692 g trimmed of membranes and cord.  The fetal surface is bluegray and glistening.  The maternal surface is extensively torn and completeness cannot be determined.  Sectioning reveals red spongy parenchyma without masses or infarcts.  Representative sections are submitted in 5 blocks as follows: 1 A- membrane roll and proximal cord; 1B-distal cord and peripheral disc; 1H-5A-nalsbh disc.  HM      Microscopic Description       The slides are reviewed and demonstrate histopathologic features supporting the above rendered diagnosis.           Diagnoses and all orders for this visit:    1. Left breast abscess (Primary)  -     doxycycline (VIBRAMYCIN) 100 MG capsule; Take 1 capsule by mouth 2 (Two) Times a Day for 10 days.  Dispense: 20 capsule; Refill: 0  -     Ambulatory Referral to General Surgery  -     mupirocin (BACTROBAN) 2 % cream; Apply 1 application  topically to the appropriate area as directed 3 (Three) Times a Day for 7 days.  Dispense: 21 g; Refill: 0          FOLLOW-UP  As discussed during visit with PCP/[facility] if no improvement or Urgent Care/Emergency Department if worsening of symptoms    Instructed pt to stop using Peroxide.  Instructed pt on wound care.  Referral has been made for general surgery.    Patient verbalizes understanding of medication dosage, comfort measures, instructions for treatment and follow-up.    Dacia Echols, APRN  09/16/2023  20:58 EDT    The use of a  video visit has been reviewed with the patient and verbal informed consent has been obtained. Myself and Breana Coleman participated in this visit. The patient is located in [address] [place] [address] DR BELLAMY [facility].    I am located in [place], [place]. [employer] and [employer] were utilized. I spent 20 minutes in the patient's chart for this visit.

## 2023-11-17 ENCOUNTER — TELEMEDICINE (OUTPATIENT)
Dept: FAMILY MEDICINE CLINIC | Facility: TELEHEALTH | Age: 33
End: 2023-11-17
Payer: COMMERCIAL

## 2023-11-17 DIAGNOSIS — N39.0 URINARY TRACT INFECTION WITHOUT HEMATURIA, SITE UNSPECIFIED: Primary | ICD-10-CM

## 2023-11-17 RX ORDER — CEPHALEXIN 500 MG/1
500 CAPSULE ORAL 4 TIMES DAILY
Qty: 28 CAPSULE | Refills: 0 | Status: SHIPPED | OUTPATIENT
Start: 2023-11-17 | End: 2023-11-24

## 2023-11-17 RX ORDER — SULFAMETHOXAZOLE AND TRIMETHOPRIM 800; 160 MG/1; MG/1
1 TABLET ORAL 2 TIMES DAILY
Qty: 20 TABLET | Refills: 0 | Status: SHIPPED | OUTPATIENT
Start: 2023-11-17 | End: 2023-11-17

## 2023-11-17 RX ORDER — PHENAZOPYRIDINE HYDROCHLORIDE 200 MG/1
200 TABLET, FILM COATED ORAL 3 TIMES DAILY PRN
Qty: 6 TABLET | Refills: 0 | Status: SHIPPED | OUTPATIENT
Start: 2023-11-17 | End: 2023-11-19

## 2023-11-17 RX ORDER — FLUCONAZOLE 150 MG/1
150 TABLET ORAL ONCE
Qty: 2 TABLET | Refills: 0 | Status: SHIPPED | OUTPATIENT
Start: 2023-11-17 | End: 2023-11-17

## 2023-11-17 NOTE — PROGRESS NOTES
You have chosen to receive care through a telehealth visit.  Do you consent to use a video/audio connection for your medical care today? Yes     CHIEF COMPLAINT  Chief Complaint   Patient presents with    Urinary Tract Infection         HPI  Breana Coleman is a 33 y.o. female  presents with complaint of urinary and urgency and frequency with burning on urination that started 3 days ago.  She is also complaining of vaginal itching.    Review of Systems   Genitourinary:  Positive for dysuria, frequency, urgency and vaginal pain. Decreased urine volume: itching.  All other systems reviewed and are negative.      Past Medical History:   Diagnosis Date    Chlamydia     Gestational hypertension     Hernia, umbilical     Staph infection     Urinary tract infection        History reviewed. No pertinent family history.    Social History     Socioeconomic History    Marital status:    Tobacco Use    Smoking status: Every Day     Packs/day: 1.00     Years: 7.00     Additional pack years: 0.00     Total pack years: 7.00     Types: Cigarettes     Passive exposure: Current    Smokeless tobacco: Never    Tobacco comments:     See chart notes   Vaping Use    Vaping Use: Never used   Substance and Sexual Activity    Alcohol use: Not Currently    Drug use: Never    Sexual activity: Defer       Breana Coleman  reports that she has been smoking cigarettes. She has a 7.00 pack-year smoking history. She has been exposed to tobacco smoke. She has never used smokeless tobacco.. I have educated her on the risk of diseases from using tobacco products such as cancer, COPD, and heart disease.     I advised her to quit and she is not willing to quit.    I spent  1  minutes counseling the patient.              There were no vitals taken for this visit.    PHYSICAL EXAM  Physical Exam   Constitutional: She appears well-developed and well-nourished.   HENT:   Head: Normocephalic.   Eyes: Pupils are equal, round, and reactive to light.    Pulmonary/Chest: Effort normal.   Abdominal:   Self guided abd exam did not reveal any tenderness on palpation.   Musculoskeletal: Normal range of motion.   Neurological: She is alert.   Psychiatric: She has a normal mood and affect.       Results for orders placed or performed during the hospital encounter of 05/16/22   COVID-19, ABBOTT IN-HOUSE,NASAL Swab (NO TRANSPORT MEDIA) 2 HR TAT - Swab, Nasopharynx    Specimen: Nasopharynx; Swab   Result Value Ref Range    COVID19 Presumptive Negative Presumptive Negative - Ref. Range   Urine Drug Screen - Urine, Clean Catch    Specimen: Urine, Clean Catch   Result Value Ref Range    THC, Screen, Urine Negative Negative    Phencyclidine (PCP), Urine Negative Negative    Cocaine Screen, Urine Positive (A) Negative    Methamphetamine, Ur Negative Negative    Opiate Screen Negative Negative    Amphetamine Screen, Urine Negative Negative    Benzodiazepine Screen, Urine Negative Negative    Tricyclic Antidepressants Screen Negative Negative    Methadone Screen, Urine Negative Negative    Barbiturates Screen, Urine Negative Negative    Oxycodone Screen, Urine Negative Negative    Propoxyphene Screen Negative Negative    Buprenorphine, Screen, Urine Negative Negative   Preeclampsia Panel    Specimen: Blood   Result Value Ref Range    Alkaline Phosphatase 338 (H) 39 - 117 U/L    ALT (SGPT) 8 1 - 33 U/L    AST (SGOT) 16 1 - 32 U/L    Creatinine 0.64 0.57 - 1.00 mg/dL    Total Bilirubin 0.3 0.0 - 1.2 mg/dL     135 - 214 U/L    Uric Acid 4.5 2.4 - 5.7 mg/dL   Cocaine, Urine, Confirmation - Urine, Clean Catch    Specimen: Urine, Clean Catch   Result Value Ref Range    Reference Lab Report See attached report    RPR    Specimen: Blood   Result Value Ref Range    RPR Non-Reactive Non-Reactive   Rubella Antibody, IgG    Specimen: Blood   Result Value Ref Range    Rubella Antibodies, IgG 4.43 Immune >0.99 index   Hepatitis B Surface Antigen    Specimen: Blood   Result Value Ref  Range    Hepatitis B Surface Ag Non-Reactive Non-Reactive   Hepatitis C Antibody    Specimen: Blood   Result Value Ref Range    Hepatitis C Ab Reactive (A) Non-Reactive   CBC Auto Differential    Specimen: Blood   Result Value Ref Range    WBC 16.91 (H) 3.40 - 10.80 10*3/mm3    RBC 3.93 3.77 - 5.28 10*6/mm3    Hemoglobin 11.5 (L) 12.0 - 15.9 g/dL    Hematocrit 34.4 34.0 - 46.6 %    MCV 87.5 79.0 - 97.0 fL    MCH 29.3 26.6 - 33.0 pg    MCHC 33.4 31.5 - 35.7 g/dL    RDW 14.4 12.3 - 15.4 %    RDW-SD 46.1 37.0 - 54.0 fl    MPV 10.3 6.0 - 12.0 fL    Platelets 360 140 - 450 10*3/mm3    Neutrophil % 80.4 (H) 42.7 - 76.0 %    Lymphocyte % 11.1 (L) 19.6 - 45.3 %    Monocyte % 7.6 5.0 - 12.0 %    Eosinophil % 0.4 0.3 - 6.2 %    Basophil % 0.1 0.0 - 1.5 %    Immature Grans % 0.4 0.0 - 0.5 %    Neutrophils, Absolute 13.59 (H) 1.70 - 7.00 10*3/mm3    Lymphocytes, Absolute 1.88 0.70 - 3.10 10*3/mm3    Monocytes, Absolute 1.29 (H) 0.10 - 0.90 10*3/mm3    Eosinophils, Absolute 0.06 0.00 - 0.40 10*3/mm3    Basophils, Absolute 0.02 0.00 - 0.20 10*3/mm3    Immature Grans, Absolute 0.07 (H) 0.00 - 0.05 10*3/mm3    nRBC 0.0 0.0 - 0.2 /100 WBC   HIV-1 / O / 2 Ag / Antibody 4th Generation    Specimen: Blood   Result Value Ref Range    HIV-1/ HIV-2 Non-Reactive Non-Reactive   Blood Gas, Venous, Cord    Specimen: Umbilical Cord; Cord Blood Venous   Result Value Ref Range    Site Umbilical     pH, Cord Venous 7.259 (L) 7.310 - 7.370 pH Units    pCO2, Cord Venous 49.2 (H) 28.0 - 40.0 mm Hg    pO2, Cord Venous 22.4 21.0 - 31.0 mm Hg    HCO3, Cord Venous 22.0 (H) 18.6 - 21.4 mmol/L    Base Excess, Cord Venous -5.5 (L) 0.0 - 2.0 mmol/L    O2 Sat, Cord Venous 45.4 %    Hemoglobin, Blood Gas 16.6 14 - 18 g/dL    CO2 Content 23.5 22 - 33 mmol/L    Temperature 37.0 C    Barometric Pressure for Blood Gas      Modality Room Air     FIO2 21 %    Ventilator Mode       Rate 0 Breaths/minute    PIP 0 cmH2O    IPAP 0     EPAP 0     O2 Saturation Calculated       Note      Collection Time     Blood Gas, Arterial, Cord    Specimen: Umbilical Cord; Cord Blood Arterial   Result Value Ref Range    Site Umbilical     pH, Cord Arterial 7.17 (C) 7.22 - 7.30 pH Units    pCO2, Cord Arterial 65.8 (H) 43.3 - 54.9 mmHg    pO2, Cord Arterial 8.7 (L) 11.5 - 43.3 mmHg    HCO3, Cord Arterial 23.9 (H) 16.9 - 20.5 mmol/L    Base Exc, Cord Arterial -6.3 (L) 0.0 - 2.0 mmol/L    O2 Sat, Cord Arterial 7.7 %    Hemoglobin, Blood Gas 16.7 14 - 18 g/dL    CO2 Content 25.9 22 - 33 mmol/L    Temperature 37.0 C    Barometric Pressure for Blood Gas      Modality Room Air     FIO2 21 %    Rate 0 Breaths/minute    PIP 0 cmH2O    IPAP 0     EPAP 0     Note      Notified Deangelo Delgadillo RN     Notified By [id number]     Notified Time 05/17/2022 23:58    Type & Screen    Specimen: Blood   Result Value Ref Range    ABO Type A     RH type Positive     Antibody Screen Negative     T&S Expiration Date 5/20/2022 11:59:59 PM    Tissue Pathology Exam    Specimen: Placenta; Tissue   Result Value Ref Range    Case Report       Surgical Pathology Report                         Case: [id number]                                  Authorizing Provider:  Isaias Barclay MD         Collected:           05/17/2022 11:30 PM          Ordering Location[facility]     [facility]   Received:            05/18/2022 08:12 AM                                 LABOR DELIVERY                                                               Pathologist:           Sandoval Castle MD                                                            Specimen:    Placenta                                                                                   Clinical Information       35-week gestation, no prenatal care, hep C, drug use      Final Diagnosis       PLACENTA, VAGINAL DELIVERY AT 35 WEEKS GESTATION:   692 grams (approximately 411 grams expected; greater than 97th percentile).   Three vessel umbilical cord with acute funisitis.   Membranes  with focal acute chorioamnionitis.   Mature third trimester placenta.  GJK      Gross Description       1. Placenta.  Received in formalin labeled placenta is an extensively disrupted dixon placenta with attached tan, thickened membranes which appear to have a marginal insertion.  Site of rupture cannot be determined.  The attached three-vessel umbilical cord is 9.5 cm in length by 1.2 cm in average diameter and is tan-white to bluegray with appropriate spiraling.  No areas of torsion, hematomas or true knots are present.  The cord inserts eccentrically, 3 cm from the disc edge.  The placental disc is 19.5 x 17 x 5 cm and 692 g trimmed of membranes and cord.  The fetal surface is bluegray and glistening.  The maternal surface is extensively torn and completeness cannot be determined.  Sectioning reveals red spongy parenchyma without masses or infarcts.  Representative sections are submitted in 5 blocks as follows: 1 A- membrane roll and proximal cord; 1B-distal cord and peripheral disc; 4E-5L-dqcveg disc.  HM    Microscopic Description       The slides are reviewed and demonstrate histopathologic features supporting the above rendered diagnosis.           Diagnoses and all orders for this visit:    1. Urinary tract infection without hematuria, site unspecified (Primary)  -     sulfamethoxazole-trimethoprim (Bactrim DS) 800-160 MG per tablet; Take 1 tablet by mouth 2 (Two) Times a Day for 10 days.  Dispense: 20 tablet; Refill: 0  -     phenazopyridine (Pyridium) 200 MG tablet; Take 1 tablet by mouth 3 (Three) Times a Day As Needed for Bladder Spasms for up to 2 days.  Dispense: 6 tablet; Refill: 0  -     fluconazole (Diflucan) 150 MG tablet; Take 1 tablet by mouth 1 (One) Time for 1 dose. Take 1 tablet on Day 1 and 1 tablet on Day 3  Dispense: 2 tablet; Refill: 0          FOLLOW-UP  As discussed during visit with PCP/[facility] if no improvement or Urgent Care/Emergency Department if worsening of  symptoms    Patient verbalizes understanding of medication dosage, comfort measures, instructions for treatment and follow-up.    Dacia Echols, APRN  11/17/2023  03:27 EST    The use of a video visit has been reviewed with the patient and verbal informed consent has been obtained. Myself and Breana Coleman participated in this visit. The patient is located in [address] [place] [address].    I am located in [place], [place]. [employer] were utilized. I spent 10 minutes in the patient's chart for this visit.

## 2023-11-18 NOTE — PROGRESS NOTES
Patient sent message that bactrim has caused some mild swelling in face. Lips alittle swollen too per patient report. Denies any trouble breathing or SOA. Advised patient to stop Bactrim and take benadryl. Advised if anything worsened to go to ER. Added to allergies.

## 2024-01-07 ENCOUNTER — TELEMEDICINE (OUTPATIENT)
Dept: FAMILY MEDICINE CLINIC | Facility: TELEHEALTH | Age: 34
End: 2024-01-07
Payer: COMMERCIAL

## 2024-01-07 DIAGNOSIS — K04.7 DENTAL ABSCESS: ICD-10-CM

## 2024-01-07 DIAGNOSIS — K04.7 DENTAL ABSCESS: Primary | ICD-10-CM

## 2024-01-07 RX ORDER — AMOXICILLIN AND CLAVULANATE POTASSIUM 875; 125 MG/1; MG/1
1 TABLET, FILM COATED ORAL 2 TIMES DAILY
Qty: 20 TABLET | Refills: 0 | Status: SHIPPED | OUTPATIENT
Start: 2024-01-07 | End: 2024-01-17

## 2024-01-07 NOTE — PROGRESS NOTES
You have chosen to receive care through a telehealth visit.  Do you consent to use a video/audio connection for your medical care today? Yes     CHIEF COMPLAINT  No chief complaint on file.        HPI  Breana Coleman is a 33 y.o. female  presents with complaint of pain in left upper tooth with facial swelling.  Is taking clindamycin and Cipro for post D & C, but not improving tooth pain. Denies fever at this time.     Review of Systems  See HPI    Past Medical History:   Diagnosis Date    Chlamydia     Gestational hypertension     Hernia, umbilical     Staph infection     Urinary tract infection        No family history on file.    Social History     Socioeconomic History    Marital status:    Tobacco Use    Smoking status: Every Day     Packs/day: 1.00     Years: 7.00     Additional pack years: 0.00     Total pack years: 7.00     Types: Cigarettes     Passive exposure: Current    Smokeless tobacco: Never    Tobacco comments:     See chart notes   Vaping Use    Vaping Use: Never used   Substance and Sexual Activity    Alcohol use: Not Currently    Drug use: Never    Sexual activity: Defer       Breana Coleman  reports that she has been smoking cigarettes. She has a 7.00 pack-year smoking history. She has been exposed to tobacco smoke. She has never used smokeless tobacco..              There were no vitals taken for this visit.    PHYSICAL EXAM  Physical Exam   Constitutional: She is oriented to person, place, and time. She appears well-developed and well-nourished. She does not have a sickly appearance. She does not appear ill.   HENT:   Head: Normocephalic and atraumatic.   Pulmonary/Chest: Effort normal.  No respiratory distress.  Neurological: She is alert and oriented to person, place, and time.           Diagnoses and all orders for this visit:    1. Dental abscess (Primary)  -     amoxicillin-clavulanate (AUGMENTIN) 875-125 MG per tablet; Take 1 tablet by mouth 2 (Two) Times a Day for 10 days.  Dispense:  20 tablet; Refill: 0    --stop clindamycin and Cipro  --take medications as prescribed  --increase fluids, rest as needed, tylenol or ibuprofen for pain  --f/u in 5-7 days if no improvement        FOLLOW-UP  As discussed during visit with PCP/[facility] if no improvement or Urgent Care/Emergency Department if worsening of symptoms    Patient verbalizes understanding of medication dosage, comfort measures, instructions for treatment and follow-up.    Daya Cortez, APRN  01/07/2024  17:24 EST    The use of a video visit has been reviewed with the patient and verbal informed consent has been obtained. Myself and Breana Coleman participated in this visit. The patient is located in [address] [place] [address] DR BELLAMY [facility].    I am located in [place], [place]. [employer] and [email or web address] were utilized. I spent 8 minutes in the patient's chart for this visit.

## 2024-01-07 NOTE — PATIENT INSTRUCTIONS
Dental Abscess    A dental abscess is an infection around a tooth that may involve pain, swelling, and a collection of pus, as well as other symptoms. Treatment is important to help with symptoms and to prevent the infection from spreading.  The general types of dental abscesses are:  Pulpal abscess. This abscess may form from the inner part of the tooth (pulp).  Periodontal abscess. This abscess may form from the gum.  What are the causes?  This condition is caused by a bacterial infection in or around the tooth. It may result from:  Severe tooth decay (cavities).  Trauma to the tooth, such as a broken or chipped tooth.  What increases the risk?  This condition is more likely to develop in males. It is also more likely to develop in people who:  Have cavities.  Have severe gum disease.  Eat sugary snacks between meals.  Use tobacco products.  Have diabetes.  Have a weakened disease-fighting system (immune system).  Do not brush and care for their teeth regularly.  What are the signs or symptoms?  Mild symptoms of this condition include:  Tenderness.  Bad breath.  Fever.  A bitter taste in the mouth.  Pain in and around the infected tooth.  Moderate symptoms of this condition include:  Swollen neck glands.  Chills.  Pus drainage.  Swelling and redness around the infected tooth, in the mouth, or in the face.  Severe pain in and around the infected tooth.  Severe symptoms of this condition include:  Difficulty swallowing.  Difficulty opening the mouth.  Nausea.  Vomiting.  How is this diagnosed?  This condition is diagnosed based on:  Your symptoms and your medical and dental history.  An examination of the infected tooth. During the exam, your dental care provider may tap on the infected tooth.  You may also need to have X-rays taken of the affected area.  How is this treated?  This condition is treated by getting rid of the infection. This may be done with:  Antibiotic medicines. These may be used in certain  situations.  Antibacterial mouth rinse.  Incision and drainage. This procedure is done by making an incision in the abscess to drain out the pus. Removing pus is the first priority in treating an abscess.  A root canal. This may be performed to save the tooth. Your dental care provider accesses the visible part of your tooth (crown) with a drill and removes any infected pulp. Then the space is filled and sealed off.  Tooth extraction. The tooth is pulled out if it cannot be saved by other treatment.  You may also receive treatment for pain, such as:  Acetaminophen or NSAIDs.  Gels that contain a numbing medicine.  An injection to block the pain near your nerve.  Follow these instructions at home:  Medicines  Take over-the-counter and prescription medicines only as told by your dental care provider.  If you were prescribed an antibiotic, take it as told by your dental care provider. Do not stop taking the antibiotic even if you start to feel better.  If you were prescribed a gel that contains a numbing medicine, use it exactly as told in the directions. Do not use these gels for children who are younger than 2 years of age.  Use an antibacterial mouth rinse as told by your dental care provider.  General instructions    Gargle with a mixture of salt and water 3-4 times a day or as needed. To make salt water, completely dissolve ½-1 tsp (3-6 g) of salt in 1 cup (237 mL) of warm water.  Eat a soft diet while your abscess is healing.  Drink enough fluid to keep your urine pale yellow.  Do not apply heat to the outside of your mouth.  Do not use any products that contain nicotine or tobacco. These products include cigarettes, chewing tobacco, and vaping devices, such as e-cigarettes. If you need help quitting, ask your dental care provider.  Keep all follow-up visits. This is important.  How is this prevented?    Excellent dental home care, which includes brushing your teeth every morning and night with fluoride  toothpaste. Floss one time each day.  Get regularly scheduled dental cleanings.  Consider having a dental sealant applied on teeth that have deep grooves to prevent cavities.  Drink fluoridated water regularly. This includes most tap water. Check the label on bottled water to see if it contains fluoride.  Reduce or eliminate sugary drinks.  Eat healthy meals and snacks.  Wear a mouth guard or face shield to protect your teeth while playing sports.  Contact a health care provider if:  Your pain is worse and is not helped by medicine.  You have swelling.  You see pus around the tooth.  You have a fever or chills.  Get help right away if:  Your symptoms suddenly get worse.  You have a very bad headache.  You have problems breathing or swallowing.  You have trouble opening your mouth.  You have swelling in your neck or around your eye.  These symptoms may represent a serious problem that is an emergency. Do not wait to see if the symptoms will go away. Get medical help right away. Call your local emergency services (911 in the [place] Do not drive yourself to the hospital.  Summary  A dental abscess is a collection of pus in or around a tooth that results from an infection.  A dental abscess may result from severe tooth decay, trauma to the tooth, or severe gum disease around a tooth.  Symptoms include severe pain, swelling, redness, and drainage of pus in and around the infected tooth.  The first priority in treating a dental abscess is to drain out the pus. Treatment may also involve removing damage inside the tooth (root canal) or extracting the tooth.  This information is not intended to replace advice given to you by your health care provider. Make sure you discuss any questions you have with your health care provider.  Document Revised: 02/24/2022 Document Reviewed: 02/24/2022  Elsevier Patient Education © 2023 [employer]

## 2024-01-08 NOTE — PROGRESS NOTES
Patient is asking to change medications to a 24 hour pharmacy. Advised patient if medications have been filled at other pharmacy she may have to pay out of pocket. Reports she was instructed to stop other antibiotics during the video visit and start Augmentin. Called [facility] called in medication Daya UMANA prescribed during video visit today

## 2024-04-26 ENCOUNTER — TELEMEDICINE (OUTPATIENT)
Dept: FAMILY MEDICINE CLINIC | Facility: TELEHEALTH | Age: 34
End: 2024-04-26
Payer: COMMERCIAL

## 2024-04-26 DIAGNOSIS — U07.1 COVID-19: Primary | ICD-10-CM

## 2024-04-26 PROBLEM — O03.9 MISCARRIAGE: Status: ACTIVE | Noted: 2023-12-17

## 2024-04-26 PROBLEM — O02.1 MISSED ABORTION: Status: ACTIVE | Noted: 2023-12-17

## 2024-04-26 PROBLEM — D62 ABLA (ACUTE BLOOD LOSS ANEMIA): Status: ACTIVE | Noted: 2023-12-17

## 2024-04-26 PROCEDURE — 1160F RVW MEDS BY RX/DR IN RCRD: CPT | Performed by: NURSE PRACTITIONER

## 2024-04-26 PROCEDURE — 1159F MED LIST DOCD IN RCRD: CPT | Performed by: NURSE PRACTITIONER

## 2024-04-26 PROCEDURE — 99213 OFFICE O/P EST LOW 20 MIN: CPT | Performed by: NURSE PRACTITIONER

## 2024-04-26 RX ORDER — DEXTROMETHORPHAN HYDROBROMIDE AND PROMETHAZINE HYDROCHLORIDE 15; 6.25 MG/5ML; MG/5ML
5 SYRUP ORAL 4 TIMES DAILY PRN
Qty: 120 ML | Refills: 0 | Status: SHIPPED | OUTPATIENT
Start: 2024-04-26

## 2024-04-26 RX ORDER — METHYLPREDNISOLONE 4 MG/1
TABLET ORAL
Qty: 21 TABLET | Refills: 0 | Status: SHIPPED | OUTPATIENT
Start: 2024-04-26

## 2024-04-26 RX ORDER — ALBUTEROL SULFATE 90 UG/1
2 AEROSOL, METERED RESPIRATORY (INHALATION) EVERY 4 HOURS PRN
Qty: 18 G | Refills: 0 | Status: SHIPPED | OUTPATIENT
Start: 2024-04-26

## 2024-04-26 NOTE — LETTER
April 26, 2024     Patient: Breana Coleman   YOB: 1990   Date of Visit: 4/26/2024       To Whom It May Concern:    It is my medical opinion that Breana Coleman may return to work/ Monday 4/29/2024. She will need to be excused from Monday 4/22/2024-Sunday 4/28/2024    Sincerely,    DONG Zaragoza      URGENT CARE VIDEO VISIT PROVIDER    CC: No Recipients

## 2024-04-26 NOTE — PATIENT INSTRUCTIONS
Drink plenty of water  Over the counter pain relievers okay   If symptoms do not improve in 3-5 days follow up with your primary care provider or urgent care  If symptoms worsen follow up with urgent care or the emergency room      Diagnosis  COVID-19 infection  Most people with COVID-19 have mild to moderate symptoms and can rest at home until they get better.   Stay home  While you're recovering, STAY HOME for at least 5 full days. Don't leave your home except to get medical care.  While at home, avoid close contact with others.  If possible, stay in a room away from other people in your home, and use a separate bathroom.  Wear a well-fitting face mask when you can't avoid contact with other people.  If you can't wear a face mask because of breathing difficulty, your caregiver should wear a face mask.  Wearing a face mask does NOT mean you can leave your home. You must continue to stay home for at least 5 full days.  You can return to your normal activities when ALL of the following are true:  You've been fever-free for at least 24 hours (1 full day) without using fever-reducing medications such as Tylenol  Your symptoms have improved  It's been at least 5 full days since your symptoms first started  You should continue to wear a mask around others until it's been at least 10 days since your symptoms first started.  Prevention  Cover your mouth and nose with a tissue when you cough or sneeze. Throw used tissues in a lined trash can right away, and wash your hands immediately after.  Avoid sharing personal items like dishes, utensils, towels, or bedding. Wash items thoroughly with soap and water after use.  Wash your hands often with soap and water for at least 20 seconds. If soap and water are not available, clean your hands with a hand  that contains at least 60% alcohol. Cover all surfaces of your hands and rub them together until they feel dry.  Avoid touching your face, especially your eyes, nose, and  "mouth.  Clean \"high-touch\" surfaces daily. \"High-touch\" surfaces include counters, tabletops, doorknobs, bathroom fixtures, toilets, phones, keyboards, tablets, and bedside tables. You can use soap, detergents, 60%-80% ethanol or isopropyl alcohol,  such as Windex, or bleach. All of these  are effective at killing the virus that causes COVID-19.  Limit contact with pets and other animals while sick. If you must care for your pet, wash your hands before and after you interact with them and wear a face mask.  What to expect  Follow the advice in the treatment section below and you should feel better within 7 to 14 days. You may continue to feel tired and have a cough for several weeks.    About your diagnosis  Common symptoms of COVID-19 include fever, cough, shortness of breath, fatigue, muscle or body aches, headaches, new loss of sense of taste or smell, sore throat, stuffy or runny nose, nausea or vomiting, and diarrhea. Most people who get COVID-19 have mild symptoms and can rest at home until they get better. Elderly people and those with chronic medical problems may be at risk for more serious complications.    Call your healthcare provider immediately if you have any of the following:  Fever over 103F  Fever that doesn't come down when you take Tylenol or ibuprofen  Fever that returns after being gone for more than 24 hours  Fever for more than 4 days  Worsening shortness of breath or difficulty breathing  Go to your nearest ER or call 911 if you have any of the following:  Shortness of breath that makes it difficult to do simple things like get dressed, bathe, or comb your hair  Persistent chest pain or chest tightness  New confusion or difficulty staying alert  Bluish color to the lips or face  Other treatment  Rest and drink plenty of sugar-free fluids.  Gargle with salt water several times a day to help your throat feel better. Cough drops and throat lozenges may provide extra relief. " A teaspoon of honey stirred into warm water or weak tea can help soothe a sore throat and cough.  If your nose or sinuses become very stuffy, try using a Neti Pot to flush them out. Neti Pots are available at any drugstore without a prescription.  Avoid smoke and air pollution. Smoke can make infections worse.      This is likely a viral illness. Viral illnesses do not respond to antibiotics. It is best to treat viruses with rest and drinking plenty of fluids. Over the counter medications can help ease symptoms.

## 2024-04-26 NOTE — PROGRESS NOTES
CHIEF COMPLAINT  Chief Complaint   Patient presents with    Cough    Wheezing         HPI  Breana Coleman is a 33 y.o. female  presents with complaint of symptoms of COVID-19 that started on Monday. She has a positive COVID-19 test on Wednesday.   Her boyfriend has COVID-19 too.     Review of Systems   Constitutional:  Positive for fatigue. Negative for chills, diaphoresis and fever.   HENT:  Positive for congestion, postnasal drip, sinus pressure and sore throat.    Respiratory:  Positive for cough (productive at times and is green), chest tightness, shortness of breath (with activity and coughing) and wheezing.    Gastrointestinal:  Negative for diarrhea, nausea and vomiting.   Musculoskeletal:  Positive for myalgias (comes and goes).   Neurological:  Positive for headaches.       Past Medical History:   Diagnosis Date    Chlamydia     Gestational hypertension     Hernia, umbilical     Staph infection     Urinary tract infection        No family history on file.    Social History     Socioeconomic History    Marital status:    Tobacco Use    Smoking status: Every Day     Current packs/day: 1.00     Average packs/day: 1 pack/day for 7.0 years (7.0 ttl pk-yrs)     Types: Cigarettes     Passive exposure: Current    Smokeless tobacco: Never    Tobacco comments:     No ready to quit. She is aware of the health consequences of smoking.    Vaping Use    Vaping status: Never Used   Substance and Sexual Activity    Alcohol use: Not Currently    Drug use: Never    Sexual activity: Defer           LMP 04/19/2024 (Approximate)   Breastfeeding No     PHYSICAL EXAM  Physical Exam   Constitutional: She is oriented to person, place, and time. She appears well-developed and well-nourished. She does not have a sickly appearance. She does not appear ill. No distress.   HENT:   Head: Normocephalic and atraumatic.   Nose: Congestion present.   Eyes: EOM are normal.   Neck: Neck normal appearance.  Pulmonary/Chest: Effort  normal.  No respiratory distress.  Neurological: She is alert and oriented to person, place, and time.   Skin: Skin is dry.   Psychiatric: She has a normal mood and affect.           Diagnoses and all orders for this visit:    1. COVID-19 (Primary)    Other orders  -     albuterol sulfate  (90 Base) MCG/ACT inhaler; Inhale 2 puffs Every 4 (Four) Hours As Needed for Wheezing or Shortness of Air.  Dispense: 18 g; Refill: 0  -     promethazine-dextromethorphan (PROMETHAZINE-DM) 6.25-15 MG/5ML syrup; Take 5 mL by mouth 4 (Four) Times a Day As Needed for Cough.  Dispense: 120 mL; Refill: 0  -     methylPREDNISolone (MEDROL) 4 MG dose pack; Take as directed on package instructions. Start in the am 4/27/2024.  Dispense: 21 tablet; Refill: 0    This is likely a viral illness. Viral illnesses do not respond to antibiotics. It is best to treat viruses with rest and drinking plenty of fluids. Over the counter medications can help ease symptoms.      The use of a video visit has been reviewed with the patient and verbal informed consent has been obtained. Myself and Breana Coleman participated in this visit. The patient is located in [address] [place] [address]. I am located in [place], [place]. [employer] and [employer] were utilized.       Note Disclaimer: At [facility], we believe that sharing information builds trust and better   relationships. You are receiving this note because you recently visited [facility]. It is possible you   will see health information before a provider has talked with you about it. This kind of information can   be easy to misunderstand. To help you fully understand what it means for your health, we urge you to   discuss this note with your provider.    Zina Copeland, DONG  04/26/2024  18:20 EDT

## 2024-06-20 ENCOUNTER — TELEMEDICINE (OUTPATIENT)
Dept: FAMILY MEDICINE CLINIC | Facility: TELEHEALTH | Age: 34
End: 2024-06-20
Payer: COMMERCIAL

## 2024-06-20 DIAGNOSIS — K04.7 DENTAL ABSCESS: Primary | ICD-10-CM

## 2024-06-20 DIAGNOSIS — H10.13 ALLERGIC CONJUNCTIVITIS OF BOTH EYES: ICD-10-CM

## 2024-06-20 PROCEDURE — 1160F RVW MEDS BY RX/DR IN RCRD: CPT | Performed by: NURSE PRACTITIONER

## 2024-06-20 PROCEDURE — 1159F MED LIST DOCD IN RCRD: CPT | Performed by: NURSE PRACTITIONER

## 2024-06-20 PROCEDURE — 99213 OFFICE O/P EST LOW 20 MIN: CPT | Performed by: NURSE PRACTITIONER

## 2024-06-20 RX ORDER — ERYTHROMYCIN 5 MG/G
OINTMENT OPHTHALMIC EVERY 6 HOURS
Qty: 3.5 G | Refills: 0 | Status: SHIPPED | OUTPATIENT
Start: 2024-06-20 | End: 2024-06-27

## 2024-06-20 RX ORDER — AMOXICILLIN 500 MG/1
500 CAPSULE ORAL 3 TIMES DAILY
Qty: 21 CAPSULE | Refills: 0 | Status: SHIPPED | OUTPATIENT
Start: 2024-06-20 | End: 2024-06-27

## 2024-06-21 ENCOUNTER — PATIENT MESSAGE (OUTPATIENT)
Dept: FAMILY MEDICINE CLINIC | Facility: TELEHEALTH | Age: 34
End: 2024-06-21
Payer: COMMERCIAL

## 2024-06-21 NOTE — PROGRESS NOTES
You have chosen to receive care through a telehealth visit.  Do you consent to use a video/audio connection for your medical care today? Yes     CHIEF COMPLAINT  Chief Complaint   Patient presents with    Dental Pain         HPI  Breana Coleman is a 33 y.o. female  presents with complaint of toothache. Reports she has several bad teeth and they flare up with infection at times. Reports she has been unable to find a dentist. Reports no fever or chills. No nausea or vomiting. Reports no swelling in face. Denies any trouble breathing.      Review of Systems   Constitutional:  Negative for chills, fatigue and fever.   HENT:  Positive for dental problem. Negative for congestion, ear discharge, ear pain, sinus pressure, sinus pain and sore throat.    Eyes:  Positive for discharge. Negative for photophobia, pain, redness, itching and visual disturbance.   Respiratory:  Negative for cough, chest tightness, shortness of breath and wheezing.    Cardiovascular:  Negative for chest pain.   Gastrointestinal:  Negative for abdominal pain, diarrhea, nausea and vomiting.   Musculoskeletal:  Negative for back pain and myalgias.   Neurological:  Negative for dizziness and headaches.   Psychiatric/Behavioral: Negative.         Past Medical History:   Diagnosis Date    Chlamydia     Gestational hypertension     Hernia, umbilical     Staph infection     Urinary tract infection        History reviewed. No pertinent family history.    Social History     Socioeconomic History    Marital status:    Tobacco Use    Smoking status: Every Day     Current packs/day: 1.00     Average packs/day: 1 pack/day for 7.0 years (7.0 ttl pk-yrs)     Types: Cigarettes     Passive exposure: Current    Smokeless tobacco: Never    Tobacco comments:     No ready to quit. She is aware of the health consequences of smoking.    Vaping Use    Vaping status: Never Used   Substance and Sexual Activity    Alcohol use: Not Currently    Drug use: Never    Sexual  activity: Frederic Coleman  reports that she has been smoking cigarettes. She has a 7 pack-year smoking history. She has been exposed to tobacco smoke. She has never used smokeless tobacco. I have educated her on the risk of diseases from using tobacco products such as cancer, COPD, and heart disease.     I advised her to quit and she is not willing to quit.     I spent  1  minutes counseling the patient.              LMP 06/16/2024   Breastfeeding No     PHYSICAL EXAM  Physical Exam   Constitutional: She is oriented to person, place, and time. She appears well-developed and well-nourished. No distress.   HENT:   Head: Normocephalic and atraumatic.   Right Ear: Hearing normal.   Left Ear: Hearing normal.   Nose: No congestion.   Mouth/Throat: Abnormal dentition. Dental abscesses and dental caries present.       Tooth broken at the gumline. + abscesses noted on gum   Eyes: Conjunctivae and lids are normal. Right eye exhibits discharge and edema. Left eye exhibits discharge and edema. Right conjunctiva is injected. Left conjunctiva is injected.   Mild swollen lower lid   Mildly injected    Pulmonary/Chest: Effort normal.  No respiratory distress.  Neurological: She is alert and oriented to person, place, and time.   Psychiatric: She has a normal mood and affect. Her speech is normal and behavior is normal.           Diagnoses and all orders for this visit:    1. Dental abscess (Primary)    2. Allergic conjunctivitis of both eyes    Other orders  -     amoxicillin (AMOXIL) 500 MG capsule; Take 1 capsule by mouth 3 (Three) Times a Day for 7 days.  Dispense: 21 capsule; Refill: 0  -     erythromycin (ROMYCIN) 5 MG/GM ophthalmic ointment; Administer  to both eyes Every 6 (Six) Hours for 7 days.  Dispense: 3.5 g; Refill: 0    Patient reports she was tested for allergies by derm and has no allergies to PCN. Reports she takes without any problems   PCP if symptoms persist   ER for any worsening symptoms such as  high fever, chest pain, eye pain, facial swelling or SOA       FOLLOW-UP  As discussed during visit with PCP/[facility] if no improvement or Urgent Care/Emergency Department if worsening of symptoms    Patient verbalizes understanding of medication dosage, comfort measures, instructions for treatment and follow-up.    DONG Costello  06/20/2024  23:00 EDT    The use of a video visit has been reviewed with the patient and verbal informed consent has been obtained. Myself and Breana Coleman participated in this visit. The patient is located in [address] [place] [address].    I am located in [place], [place]. [employer] and [employer] were utilized. I spent 5 minutes in the patient's chart for this visit.      Note Disclaimer: At [facility], we believe that sharing information builds trust and better   relationships. You are receiving this note because you recently visited [facility]. It is possible you   will see health information before a provider has talked with you about it. This kind of information can   be easy to misunderstand. To help you fully understand what it means for your health, we urge you to   discuss this note with your provider.

## 2024-06-21 NOTE — LETTER
[facility]  [facility]  [address]  [address]  [facility] [address]  Phone: [phone]  Fax: [phone]    Breana Coleman was seen and treated in our Urgent Care on 6/20/24  She may return to work on 6/21/24       .        Thank you for choosing [facility].      Phillip Ferrell

## 2024-06-22 NOTE — TELEPHONE ENCOUNTER
From: Breana Coleman  To: Nadeen Dolan  Sent: 6/21/2024 8:24 PM EDT  Subject: Doctor's Note     Hello I seen you yesterday over my teeth and eye infections. I need a doctor's note. Alex if they get somewhere else now or what. I looked we're it says letters but there wasn't one there. If you could send me one that would be awesome. Thank you

## 2024-12-28 ENCOUNTER — TELEMEDICINE (OUTPATIENT)
Dept: FAMILY MEDICINE CLINIC | Facility: TELEHEALTH | Age: 34
End: 2024-12-28
Payer: COMMERCIAL

## 2024-12-28 DIAGNOSIS — J00 ACUTE NASOPHARYNGITIS: Primary | ICD-10-CM

## 2024-12-28 RX ORDER — BROMPHENIRAMINE MALEATE, PSEUDOEPHEDRINE HYDROCHLORIDE, AND DEXTROMETHORPHAN HYDROBROMIDE 2; 30; 10 MG/5ML; MG/5ML; MG/5ML
5 SYRUP ORAL 4 TIMES DAILY PRN
Qty: 118 ML | Refills: 0 | Status: SHIPPED | OUTPATIENT
Start: 2024-12-28 | End: 2025-01-03

## 2024-12-28 RX ORDER — PREDNISONE 20 MG/1
20 TABLET ORAL 2 TIMES DAILY
Qty: 10 TABLET | Refills: 0 | Status: SHIPPED | OUTPATIENT
Start: 2024-12-28 | End: 2025-01-02

## 2024-12-29 NOTE — PROGRESS NOTES
You have chosen to receive care through a telehealth visit.  Do you consent to use a video/audio connection for your medical care today? Yes     HPI  History of Present Illness  The patient is a 34-year-old female who presents via virtual visit today with a severe cough and green phlegm, suspecting a sinus infection.    Her symptoms began with a severe sore throat, followed by nasal congestion, and have since progressed to a deep cough. She reports experiencing pain in her nose, throat, and right ear, accompanied by an unpleasant ringing sensation. She is not experiencing any fever or ear drainage. The ear pain is described as a random throbbing sensation that intensifies sporadically. She also reports postnasal drainage, runny nose, and shortness of breath. She has not examined her throat. She has had a couple of headaches but no dizziness. She has not been exposed to influenza or COVID-19. She has not been on any steroids recently. She has not used Promethazine DM cough syrup for a while, last using it during a previous COVID-19 infection. She has not been tested for COVID-19. She is not experiencing any facial tenderness, nausea, vomiting, body aches, or lymph node swelling. She has been managing her symptoms with Tylenol and ibuprofen for the past 3 to 4 days. She occasionally experiences high blood pressure and is a smoker, although she expresses a desire to quit. She has an albuterol inhaler, which she uses regularly.    SOCIAL HISTORY  The patient is a smoker.    MEDICATIONS  Current: Tylenol, ibuprofen, albuterol inhaler.  Past: Medrol Dosepak, Promethazine DM cough syrup.    Review of Systems   Constitutional:  Positive for activity change (decreased) and fatigue. Negative for fever.   HENT:  Positive for congestion, ear pain (right ear), postnasal drip, rhinorrhea and sore throat. Negative for ear discharge, sinus pressure and sinus pain.    Respiratory:  Positive for cough, shortness of breath and  wheezing.    Cardiovascular: Negative.    Gastrointestinal: Negative.    Musculoskeletal:  Negative for myalgias.   Neurological:  Positive for dizziness and headaches.   Hematological: Negative.    Psychiatric/Behavioral: Negative.         Past Medical History:   Diagnosis Date    Chlamydia     Gestational hypertension     Hernia, umbilical     Staph infection     Urinary tract infection        History reviewed. No pertinent family history.    Social History     Socioeconomic History    Marital status:    Tobacco Use    Smoking status: Every Day     Current packs/day: 1.00     Average packs/day: 1 pack/day for 7.0 years (7.0 ttl pk-yrs)     Types: Cigarettes     Passive exposure: Current    Smokeless tobacco: Never    Tobacco comments:     No ready to quit. She is aware of the health consequences of smoking.    Vaping Use    Vaping status: Never Used   Substance and Sexual Activity    Alcohol use: Not Currently    Drug use: Never    Sexual activity: Defer         There were no vitals taken for this visit.    PHYSICAL EXAM  Physical Exam   Constitutional: She is oriented to person, place, and time. She appears well-developed and well-nourished. She does not have a sickly appearance. She does not appear ill. No distress.   HENT:   Head: Normocephalic and atraumatic.   Pulmonary/Chest: Effort normal.  No respiratory distress. She no audible wheeze...  Neurological: She is alert and oriented to person, place, and time.   Psychiatric: She has a normal mood and affect.   Vitals reviewed.    Physical Exam      Diagnoses and all orders for this visit:    1. Acute nasopharyngitis (Primary)  -     predniSONE (DELTASONE) 20 MG tablet; Take 1 tablet by mouth 2 (Two) Times a Day for 5 days.  Dispense: 10 tablet; Refill: 0  -     brompheniramine-pseudoephedrine-DM 30-2-10 MG/5ML syrup; Take 5 mL by mouth 4 (Four) Times a Day As Needed for Cough or Congestion for up to 6 days.  Dispense: 118 mL; Refill: 0    Increase water  intake and rest.   Assessment & Plan  1. Suspected sinus infection.  The patient reports a progression of symptoms starting with a sore throat, followed by nasal congestion, and now a deep cough with green phlegm. She also experiences right ear pain, postnasal drainage, and shortness of breath. There is no fever, lymph node swelling, or dizziness, but she has had a couple of headaches. Given the current prevalence of influenza, COVID-19, and RSV, these conditions are considered in the differential diagnosis. A COVID-19 test is recommended to rule out infection. It is premature to initiate antibiotic therapy as symptoms have persisted for less than 7 days. She is advised to continue using her albuterol inhaler twice daily and rinse her mouth afterward. A prescription for prednisone 20 mg bid daily for 5 days, to be taken in the morning with food, is provided. Additionally, Bromfed DM cough syrup is prescribed. The prescriptions will be sent to [facility] at [address] [place] [address].      FOLLOW-UP  As discussed during visit with [facility], if symptoms worsen or fail to improve, follow-up with PCP/Urgent Care/Emergency Department.    Patient verbalizes understanding of medications, instructions for treatment and follow-up.    Patient or patient representative verbalized consent for the use of Ambient Listening during the visit with  DONG Parks for chart documentation. 12/28/2024  21:51 EST    DONG Parks  12/28/2024  21:38 EST    The use of a video visit has been reviewed with the patient and verbal informed consent has been obtained. Myself and Breana Coleman participated in this visit. The patient is located in [facility], and I am located in [place], [place]. [employer] and [facility] were utilized.

## 2025-03-02 ENCOUNTER — TELEMEDICINE (OUTPATIENT)
Dept: FAMILY MEDICINE CLINIC | Facility: TELEHEALTH | Age: 35
End: 2025-03-02
Payer: COMMERCIAL

## 2025-03-02 DIAGNOSIS — B34.9 VIRAL ILLNESS: Primary | ICD-10-CM

## 2025-03-02 RX ORDER — OSELTAMIVIR PHOSPHATE 75 MG/1
75 CAPSULE ORAL 2 TIMES DAILY
Qty: 10 CAPSULE | Refills: 0 | Status: SHIPPED | OUTPATIENT
Start: 2025-03-02 | End: 2025-03-07

## 2025-03-02 RX ORDER — BROMPHENIRAMINE MALEATE, PSEUDOEPHEDRINE HYDROCHLORIDE, AND DEXTROMETHORPHAN HYDROBROMIDE 2; 30; 10 MG/5ML; MG/5ML; MG/5ML
5 SYRUP ORAL 4 TIMES DAILY PRN
Qty: 118 ML | Refills: 0 | Status: SHIPPED | OUTPATIENT
Start: 2025-03-02 | End: 2025-03-07

## 2025-03-03 NOTE — PROGRESS NOTES
You have chosen to receive care through a telehealth visit.  Do you consent to use a video/audio connection for your medical care today? Yes     CHIEF COMPLAINT  Chief Complaint   Patient presents with    Flu Symptoms         HPI  Breana Coleman is a 34 y.o. female  presents with complaint of runny nose, fever, chills, body aches and cough that started yesterday.      Review of Systems   Constitutional:  Positive for chills and fever.   HENT:  Positive for congestion.    Respiratory:  Positive for cough.    Musculoskeletal:  Positive for myalgias.       Past Medical History:   Diagnosis Date    Chlamydia     Gestational hypertension     Hernia, umbilical     Staph infection     Urinary tract infection        History reviewed. No pertinent family history.    Social History     Socioeconomic History    Marital status:    Tobacco Use    Smoking status: Every Day     Current packs/day: 1.00     Average packs/day: 1 pack/day for 7.0 years (7.0 ttl pk-yrs)     Types: Cigarettes     Passive exposure: Current    Smokeless tobacco: Never    Tobacco comments:     No ready to quit. She is aware of the health consequences of smoking.    Vaping Use    Vaping status: Never Used   Substance and Sexual Activity    Alcohol use: Not Currently    Drug use: Never    Sexual activity: Defer       Breana Colemna  reports that she has been smoking cigarettes. She has a 7 pack-year smoking history. She has been exposed to tobacco smoke. She has never used smokeless tobacco. I have educated her on the risk of diseases from using tobacco products such as cancer, COPD, and heart disease.     I advised her to quit and she is not willing to quit.    I spent  1  minutes counseling the patient.              There were no vitals taken for this visit.    PHYSICAL EXAM  Physical Exam   Constitutional: She appears well-developed and well-nourished.   HENT:   Head: Normocephalic.   Eyes: Pupils are equal, round, and reactive to light.    Pulmonary/Chest: Effort normal.   Musculoskeletal: Normal range of motion.   Neurological: She is alert.   Psychiatric: She has a normal mood and affect.       Results for orders placed or performed during the hospital encounter of 05/16/22   Urine Drug Screen - Urine, Clean Catch    Collection Time: 05/16/22 11:39 PM    Specimen: Urine, Clean Catch   Result Value Ref Range    THC, Screen, Urine Negative Negative    Phencyclidine (PCP), Urine Negative Negative    Cocaine Screen, Urine Positive (A) Negative    Methamphetamine, Ur Negative Negative    Opiate Screen Negative Negative    Amphetamine Screen, Urine Negative Negative    Benzodiazepine Screen, Urine Negative Negative    Tricyclic Antidepressants Screen Negative Negative    Methadone Screen, Urine Negative Negative    Barbiturates Screen, Urine Negative Negative    Oxycodone Screen, Urine Negative Negative    Propoxyphene Screen Negative Negative    Buprenorphine, Screen, Urine Negative Negative   Cocaine, Urine, Confirmation - Urine, Clean Catch    Collection Time: 05/16/22 11:39 PM    Specimen: Urine, Clean Catch   Result Value Ref Range    Reference Lab Report See attached report    COVID-19, ABBOTT IN-HOUSE,NASAL Swab (NO TRANSPORT MEDIA) 2 HR TAT - Swab, Nasopharynx    Collection Time: 05/17/22 12:10 AM    Specimen: Nasopharynx; Swab   Result Value Ref Range    COVID19 Presumptive Negative Presumptive Negative - Ref. Range   Preeclampsia Panel    Collection Time: 05/17/22  2:05 AM    Specimen: Blood   Result Value Ref Range    Alkaline Phosphatase 338 (H) 39 - 117 U/L    ALT (SGPT) 8 1 - 33 U/L    AST (SGOT) 16 1 - 32 U/L    Creatinine 0.64 0.57 - 1.00 mg/dL    Total Bilirubin 0.3 0.0 - 1.2 mg/dL     135 - 214 U/L    Uric Acid 4.5 2.4 - 5.7 mg/dL   RPR    Collection Time: 05/17/22  2:05 AM    Specimen: Blood   Result Value Ref Range    RPR Non-Reactive Non-Reactive   Rubella Antibody, IgG    Collection Time: 05/17/22  2:05 AM    Specimen: Blood    Result Value Ref Range    Rubella Antibodies, IgG 4.43 Immune >0.99 index   Hepatitis B Surface Antigen    Collection Time: 05/17/22  2:05 AM    Specimen: Blood   Result Value Ref Range    Hepatitis B Surface Ag Non-Reactive Non-Reactive   Hepatitis C Antibody    Collection Time: 05/17/22  2:05 AM    Specimen: Blood   Result Value Ref Range    Hepatitis C Ab Reactive (A) Non-Reactive   HIV-1 / O / 2 Ag / Antibody 4th Generation    Collection Time: 05/17/22  2:05 AM    Specimen: Blood   Result Value Ref Range    HIV-1/ HIV-2 Non-Reactive Non-Reactive   Type & Screen    Collection Time: 05/17/22  2:05 AM    Specimen: Blood   Result Value Ref Range    ABO Type A     RH type Positive     Antibody Screen Negative     T&S Expiration Date 5/20/2022 11:59:59 PM    CBC Auto Differential    Collection Time: 05/17/22  2:58 AM    Specimen: Blood   Result Value Ref Range    WBC 16.91 (H) 3.40 - 10.80 10*3/mm3    RBC 3.93 3.77 - 5.28 10*6/mm3    Hemoglobin 11.5 (L) 12.0 - 15.9 g/dL    Hematocrit 34.4 34.0 - 46.6 %    MCV 87.5 79.0 - 97.0 fL    MCH 29.3 26.6 - 33.0 pg    MCHC 33.4 31.5 - 35.7 g/dL    RDW 14.4 12.3 - 15.4 %    RDW-SD 46.1 37.0 - 54.0 fl    MPV 10.3 6.0 - 12.0 fL    Platelets 360 140 - 450 10*3/mm3    Neutrophil % 80.4 (H) 42.7 - 76.0 %    Lymphocyte % 11.1 (L) 19.6 - 45.3 %    Monocyte % 7.6 5.0 - 12.0 %    Eosinophil % 0.4 0.3 - 6.2 %    Basophil % 0.1 0.0 - 1.5 %    Immature Grans % 0.4 0.0 - 0.5 %    Neutrophils, Absolute 13.59 (H) 1.70 - 7.00 10*3/mm3    Lymphocytes, Absolute 1.88 0.70 - 3.10 10*3/mm3    Monocytes, Absolute 1.29 (H) 0.10 - 0.90 10*3/mm3    Eosinophils, Absolute 0.06 0.00 - 0.40 10*3/mm3    Basophils, Absolute 0.02 0.00 - 0.20 10*3/mm3    Immature Grans, Absolute 0.07 (H) 0.00 - 0.05 10*3/mm3    nRBC 0.0 0.0 - 0.2 /100 WBC   Tissue Pathology Exam    Collection Time: 05/17/22 11:30 PM    Specimen: Placenta; Tissue   Result Value Ref Range    Case Report       Surgical Pathology Report                          Case: [id number]                                  Authorizing Provider:  Isaias Barclay MD         Collected:           05/17/2022 11:30 PM          Ordering Location:     [facility]   Received:            05/18/2022 08:12 AM                                 LABOR DELIVERY                                                               Pathologist:           Sandoval Castle MD                                                            Specimen:    Placenta                                                                                   Clinical Information       35-week gestation, no prenatal care, hep C, drug use      Final Diagnosis       PLACENTA, VAGINAL DELIVERY AT 35 WEEKS GESTATION:   692 grams (approximately 411 grams expected; greater than 97th percentile).   Three vessel umbilical cord with acute funisitis.   Membranes with focal acute chorioamnionitis.   Mature third trimester placenta.  GJK      Gross Description       1. Placenta.  Received in formalin labeled placenta is an extensively disrupted dixon placenta with attached tan, thickened membranes which appear to have a marginal insertion.  Site of rupture cannot be determined.  The attached three-vessel umbilical cord is 9.5 cm in length by 1.2 cm in average diameter and is tan-white to bluegray with appropriate spiraling.  No areas of torsion, hematomas or true knots are present.  The cord inserts eccentrically, 3 cm from the disc edge.  The placental disc is 19.5 x 17 x 5 cm and 692 g trimmed of membranes and cord.  The fetal surface is bluegray and glistening.  The maternal surface is extensively torn and completeness cannot be determined.  Sectioning reveals red spongy parenchyma without masses or infarcts.  Representative sections are submitted in 5 blocks as follows: 1 A- membrane roll and proximal cord; 1B-distal cord and peripheral disc; 6O-8H-abaeuv disc.  HM    Microscopic Description       The slides are  reviewed and demonstrate histopathologic features supporting the above rendered diagnosis.       Blood Gas, Venous, Cord    Collection Time: 05/17/22 11:47 PM    Specimen: Umbilical Cord; Cord Blood Venous   Result Value Ref Range    Site Umbilical     pH, Cord Venous 7.259 (L) 7.310 - 7.370 pH Units    pCO2, Cord Venous 49.2 (H) 28.0 - 40.0 mm Hg    pO2, Cord Venous 22.4 21.0 - 31.0 mm Hg    HCO3, Cord Venous 22.0 (H) 18.6 - 21.4 mmol/L    Base Excess, Cord Venous -5.5 (L) 0.0 - 2.0 mmol/L    O2 Sat, Cord Venous 45.4 %    Hemoglobin, Blood Gas 16.6 14 - 18 g/dL    CO2 Content 23.5 22 - 33 mmol/L    Temperature 37.0 C    Barometric Pressure for Blood Gas      Modality Room Air     FIO2 21 %    Ventilator Mode       Rate 0 Breaths/minute    PIP 0 cmH2O    IPAP 0     EPAP 0     O2 Saturation Calculated      Note      Collection Time     Blood Gas, Arterial, Cord    Collection Time: 05/17/22 11:48 PM    Specimen: Umbilical Cord; Cord Blood Arterial   Result Value Ref Range    Site Umbilical     pH, Cord Arterial 7.17 (C) 7.22 - 7.30 pH Units    pCO2, Cord Arterial 65.8 (H) 43.3 - 54.9 mmHg    pO2, Cord Arterial 8.7 (L) 11.5 - 43.3 mmHg    HCO3, Cord Arterial 23.9 (H) 16.9 - 20.5 mmol/L    Base Exc, Cord Arterial -6.3 (L) 0.0 - 2.0 mmol/L    O2 Sat, Cord Arterial 7.7 %    Hemoglobin, Blood Gas 16.7 14 - 18 g/dL    CO2 Content 25.9 22 - 33 mmol/L    Temperature 37.0 C    Barometric Pressure for Blood Gas      Modality Room Air     FIO2 21 %    Rate 0 Breaths/minute    PIP 0 cmH2O    IPAP 0     EPAP 0     Note      Notified Deangelo Delgadillo RN     Notified By [id number]     Notified Time 05/17/2022 23:58        Diagnoses and all orders for this visit:    1. Viral illness (Primary)  -     oseltamivir (Tamiflu) 75 MG capsule; Take 1 capsule by mouth 2 (Two) Times a Day for 5 days.  Dispense: 10 capsule; Refill: 0  -     brompheniramine-pseudoephedrine-DM 30-2-10 MG/5ML syrup; Take 5 mL by mouth 4 (Four) Times a Day As Needed for  Allergies for up to 5 days.  Dispense: 118 mL; Refill: 0          FOLLOW-UP  As discussed during visit with PCP/Virtual Care if no improvement or Urgent Care/Emergency Department if worsening of symptoms    Patient verbalizes understanding of medication dosage, comfort measures, instructions for treatment and follow-up.    Dacia EcholsDONG  03/02/2025  22:06 EST    Mode of Visit: Video  Location of patient: -HOME-  Location of provider: +HOME+  You have chosen to receive care through a telehealth visit.  The patient has signed the video visit consent form.  The visit included audio and video interaction. No technical issues occurred during this visit.      The use of a video visit has been reviewed with the patient and verbal informed consent has been obtained. Myself and Breana Coleman participated in this visit. The patient is located in [address] [place] [address].   I am located in [place], [place]. [employer] and [employer] were utilized. I spent 10 minutes in the patient's chart for this visit.         Note Disclaimer: At [facility], we believe that sharing information builds trust and better   relationships. You are receiving this note because you recently visited [facility]. It is possible you   will see health information before a provider has talked with you about it. This kind of information can   be easy to misunderstand. To help you fully understand what it means for your health, we urge you to   discuss this note with your provider.